# Patient Record
Sex: FEMALE | Race: WHITE | NOT HISPANIC OR LATINO | Employment: OTHER | URBAN - METROPOLITAN AREA
[De-identification: names, ages, dates, MRNs, and addresses within clinical notes are randomized per-mention and may not be internally consistent; named-entity substitution may affect disease eponyms.]

---

## 2022-09-04 ENCOUNTER — HOSPITAL ENCOUNTER (OUTPATIENT)
Facility: HOSPITAL | Age: 87
Setting detail: OBSERVATION
Discharge: HOME | End: 2022-09-06
Attending: EMERGENCY MEDICINE | Admitting: RADIOLOGY
Payer: MEDICARE

## 2022-09-04 DIAGNOSIS — K56.609 SBO (SMALL BOWEL OBSTRUCTION) (H): ICD-10-CM

## 2022-09-04 PROCEDURE — 83690 ASSAY OF LIPASE: CPT | Performed by: EMERGENCY MEDICINE

## 2022-09-04 PROCEDURE — 250N000011 HC RX IP 250 OP 636: Performed by: EMERGENCY MEDICINE

## 2022-09-04 PROCEDURE — C9803 HOPD COVID-19 SPEC COLLECT: HCPCS

## 2022-09-04 PROCEDURE — 99285 EMERGENCY DEPT VISIT HI MDM: CPT | Mod: 25

## 2022-09-04 PROCEDURE — 96361 HYDRATE IV INFUSION ADD-ON: CPT

## 2022-09-04 PROCEDURE — 96376 TX/PRO/DX INJ SAME DRUG ADON: CPT

## 2022-09-04 PROCEDURE — 96374 THER/PROPH/DIAG INJ IV PUSH: CPT

## 2022-09-04 PROCEDURE — 258N000003 HC RX IP 258 OP 636: Performed by: EMERGENCY MEDICINE

## 2022-09-04 PROCEDURE — 96375 TX/PRO/DX INJ NEW DRUG ADDON: CPT

## 2022-09-04 PROCEDURE — 93005 ELECTROCARDIOGRAM TRACING: CPT | Performed by: EMERGENCY MEDICINE

## 2022-09-04 PROCEDURE — 85025 COMPLETE CBC W/AUTO DIFF WBC: CPT | Performed by: EMERGENCY MEDICINE

## 2022-09-04 PROCEDURE — 36415 COLL VENOUS BLD VENIPUNCTURE: CPT | Performed by: EMERGENCY MEDICINE

## 2022-09-04 PROCEDURE — 84484 ASSAY OF TROPONIN QUANT: CPT | Performed by: EMERGENCY MEDICINE

## 2022-09-04 RX ORDER — MORPHINE SULFATE 4 MG/ML
4 INJECTION, SOLUTION INTRAMUSCULAR; INTRAVENOUS
Status: COMPLETED | OUTPATIENT
Start: 2022-09-04 | End: 2022-09-05

## 2022-09-04 RX ORDER — ONDANSETRON 2 MG/ML
4 INJECTION INTRAMUSCULAR; INTRAVENOUS EVERY 30 MIN PRN
Status: DISCONTINUED | OUTPATIENT
Start: 2022-09-04 | End: 2022-09-06 | Stop reason: HOSPADM

## 2022-09-04 RX ADMIN — ONDANSETRON 4 MG: 2 INJECTION INTRAMUSCULAR; INTRAVENOUS at 23:47

## 2022-09-04 RX ADMIN — SODIUM CHLORIDE 500 ML: 9 INJECTION, SOLUTION INTRAVENOUS at 23:47

## 2022-09-04 ASSESSMENT — ENCOUNTER SYMPTOMS
VOMITING: 1
ABDOMINAL PAIN: 1
DIARRHEA: 1
NAUSEA: 1
ABDOMINAL DISTENTION: 0

## 2022-09-04 ASSESSMENT — ACTIVITIES OF DAILY LIVING (ADL): ADLS_ACUITY_SCORE: 35

## 2022-09-05 ENCOUNTER — APPOINTMENT (OUTPATIENT)
Dept: RADIOLOGY | Facility: HOSPITAL | Age: 87
End: 2022-09-05
Attending: INTERNAL MEDICINE
Payer: MEDICARE

## 2022-09-05 ENCOUNTER — APPOINTMENT (OUTPATIENT)
Dept: CT IMAGING | Facility: HOSPITAL | Age: 87
End: 2022-09-05
Attending: EMERGENCY MEDICINE
Payer: MEDICARE

## 2022-09-05 ENCOUNTER — APPOINTMENT (OUTPATIENT)
Dept: RADIOLOGY | Facility: HOSPITAL | Age: 87
End: 2022-09-05
Attending: FAMILY MEDICINE
Payer: MEDICARE

## 2022-09-05 PROBLEM — E78.5 DYSLIPIDEMIA: Status: ACTIVE | Noted: 2022-09-05

## 2022-09-05 PROBLEM — K56.609 SBO (SMALL BOWEL OBSTRUCTION) (H): Status: ACTIVE | Noted: 2022-09-05

## 2022-09-05 PROBLEM — K21.9 GERD (GASTROESOPHAGEAL REFLUX DISEASE): Status: ACTIVE | Noted: 2022-09-05

## 2022-09-05 PROBLEM — N18.30 CKD (CHRONIC KIDNEY DISEASE) STAGE 3, GFR 30-59 ML/MIN (H): Status: ACTIVE | Noted: 2022-09-05

## 2022-09-05 PROBLEM — E03.9 HYPOTHYROIDISM: Status: ACTIVE | Noted: 2022-09-05

## 2022-09-05 PROBLEM — J30.2 SEASONAL ALLERGIES: Status: ACTIVE | Noted: 2022-09-05

## 2022-09-05 LAB
ACANTHOCYTES BLD QL SMEAR: SLIGHT
ALBUMIN SERPL-MCNC: 3.7 G/DL (ref 3.5–5)
ALBUMIN UR-MCNC: 10 MG/DL
ALP SERPL-CCNC: 72 U/L (ref 45–120)
ALT SERPL W P-5'-P-CCNC: 11 U/L (ref 0–45)
ANION GAP SERPL CALCULATED.3IONS-SCNC: 10 MMOL/L (ref 5–18)
APPEARANCE UR: CLEAR
AST SERPL W P-5'-P-CCNC: 17 U/L (ref 0–40)
BASOPHILS # BLD AUTO: 0 10E3/UL (ref 0–0.2)
BASOPHILS NFR BLD AUTO: 0 %
BILIRUB SERPL-MCNC: 0.5 MG/DL (ref 0–1)
BILIRUB UR QL STRIP: NEGATIVE
BUN SERPL-MCNC: 21 MG/DL (ref 8–28)
BURR CELLS BLD QL SMEAR: SLIGHT
CALCIUM SERPL-MCNC: 9.1 MG/DL (ref 8.5–10.5)
CHLORIDE BLD-SCNC: 106 MMOL/L (ref 98–107)
CO2 SERPL-SCNC: 23 MMOL/L (ref 22–31)
COLOR UR AUTO: ABNORMAL
CREAT SERPL-MCNC: 1.12 MG/DL (ref 0.6–1.1)
EOSINOPHIL # BLD AUTO: 0 10E3/UL (ref 0–0.7)
EOSINOPHIL NFR BLD AUTO: 0 %
ERYTHROCYTE [DISTWIDTH] IN BLOOD BY AUTOMATED COUNT: 14.6 % (ref 10–15)
GFR SERPL CREATININE-BSD FRML MDRD: 44 ML/MIN/1.73M2
GLUCOSE BLD-MCNC: 129 MG/DL (ref 70–125)
GLUCOSE UR STRIP-MCNC: NEGATIVE MG/DL
HCT VFR BLD AUTO: 43.2 % (ref 35–47)
HGB BLD-MCNC: 14.3 G/DL (ref 11.7–15.7)
HGB UR QL STRIP: NEGATIVE
HOLD SPECIMEN: NORMAL
IMM GRANULOCYTES # BLD: 0 10E3/UL
IMM GRANULOCYTES NFR BLD: 0 %
KETONES UR STRIP-MCNC: NEGATIVE MG/DL
LACTATE SERPL-SCNC: 1 MMOL/L (ref 0.7–2)
LEUKOCYTE ESTERASE UR QL STRIP: NEGATIVE
LIPASE SERPL-CCNC: 26 U/L (ref 0–52)
LYMPHOCYTES # BLD AUTO: 1 10E3/UL (ref 0.8–5.3)
LYMPHOCYTES NFR BLD AUTO: 11 %
MAGNESIUM SERPL-MCNC: 2 MG/DL (ref 1.8–2.6)
MCH RBC QN AUTO: 30 PG (ref 26.5–33)
MCHC RBC AUTO-ENTMCNC: 33.1 G/DL (ref 31.5–36.5)
MCV RBC AUTO: 91 FL (ref 78–100)
MONOCYTES # BLD AUTO: 0.6 10E3/UL (ref 0–1.3)
MONOCYTES NFR BLD AUTO: 6 %
MUCOUS THREADS #/AREA URNS LPF: PRESENT /LPF
NEUTROPHILS # BLD AUTO: 7.9 10E3/UL (ref 1.6–8.3)
NEUTROPHILS NFR BLD AUTO: 83 %
NITRATE UR QL: NEGATIVE
NRBC # BLD AUTO: 0 10E3/UL
NRBC BLD AUTO-RTO: 0 /100
PH UR STRIP: 6 [PH] (ref 5–7)
PLAT MORPH BLD: ABNORMAL
PLATELET # BLD AUTO: 178 10E3/UL (ref 150–450)
POTASSIUM BLD-SCNC: 4.1 MMOL/L (ref 3.5–5)
PROT SERPL-MCNC: 7.3 G/DL (ref 6–8)
RBC # BLD AUTO: 4.77 10E6/UL (ref 3.8–5.2)
RBC MORPH BLD: ABNORMAL
RBC URINE: <1 /HPF
SARS-COV-2 RNA RESP QL NAA+PROBE: NEGATIVE
SODIUM SERPL-SCNC: 139 MMOL/L (ref 136–145)
SP GR UR STRIP: >1.05 (ref 1–1.03)
SQUAMOUS EPITHELIAL: <1 /HPF
TROPONIN I SERPL-MCNC: 0.02 NG/ML (ref 0–0.29)
UROBILINOGEN UR STRIP-MCNC: <2 MG/DL
WBC # BLD AUTO: 9.5 10E3/UL (ref 4–11)
WBC URINE: <1 /HPF

## 2022-09-05 PROCEDURE — 99221 1ST HOSP IP/OBS SF/LOW 40: CPT | Performed by: SURGERY

## 2022-09-05 PROCEDURE — U0003 INFECTIOUS AGENT DETECTION BY NUCLEIC ACID (DNA OR RNA); SEVERE ACUTE RESPIRATORY SYNDROME CORONAVIRUS 2 (SARS-COV-2) (CORONAVIRUS DISEASE [COVID-19]), AMPLIFIED PROBE TECHNIQUE, MAKING USE OF HIGH THROUGHPUT TECHNOLOGIES AS DESCRIBED BY CMS-2020-01-R: HCPCS | Performed by: EMERGENCY MEDICINE

## 2022-09-05 PROCEDURE — 120N000001 HC R&B MED SURG/OB

## 2022-09-05 PROCEDURE — 999N000065 XR ABDOMEN PORT 1 VIEW

## 2022-09-05 PROCEDURE — 36415 COLL VENOUS BLD VENIPUNCTURE: CPT | Performed by: EMERGENCY MEDICINE

## 2022-09-05 PROCEDURE — 250N000009 HC RX 250: Performed by: EMERGENCY MEDICINE

## 2022-09-05 PROCEDURE — 74018 RADEX ABDOMEN 1 VIEW: CPT | Mod: 76

## 2022-09-05 PROCEDURE — 83735 ASSAY OF MAGNESIUM: CPT | Performed by: EMERGENCY MEDICINE

## 2022-09-05 PROCEDURE — 81001 URINALYSIS AUTO W/SCOPE: CPT | Performed by: EMERGENCY MEDICINE

## 2022-09-05 PROCEDURE — 74340 X-RAY GUIDE FOR GI TUBE: CPT

## 2022-09-05 PROCEDURE — 74018 RADEX ABDOMEN 1 VIEW: CPT

## 2022-09-05 PROCEDURE — 96372 THER/PROPH/DIAG INJ SC/IM: CPT | Performed by: INTERNAL MEDICINE

## 2022-09-05 PROCEDURE — 80053 COMPREHEN METABOLIC PANEL: CPT | Performed by: EMERGENCY MEDICINE

## 2022-09-05 PROCEDURE — 250N000013 HC RX MED GY IP 250 OP 250 PS 637: Performed by: FAMILY MEDICINE

## 2022-09-05 PROCEDURE — C9113 INJ PANTOPRAZOLE SODIUM, VIA: HCPCS | Performed by: INTERNAL MEDICINE

## 2022-09-05 PROCEDURE — 250N000011 HC RX IP 250 OP 636: Performed by: INTERNAL MEDICINE

## 2022-09-05 PROCEDURE — 99223 1ST HOSP IP/OBS HIGH 75: CPT | Mod: AI | Performed by: INTERNAL MEDICINE

## 2022-09-05 PROCEDURE — 258N000003 HC RX IP 258 OP 636: Performed by: INTERNAL MEDICINE

## 2022-09-05 PROCEDURE — 250N000011 HC RX IP 250 OP 636: Performed by: EMERGENCY MEDICINE

## 2022-09-05 PROCEDURE — 74177 CT ABD & PELVIS W/CONTRAST: CPT | Mod: MG

## 2022-09-05 PROCEDURE — 83605 ASSAY OF LACTIC ACID: CPT | Performed by: EMERGENCY MEDICINE

## 2022-09-05 RX ORDER — ASCORBIC ACID 500 MG
500 TABLET ORAL DAILY
COMMUNITY

## 2022-09-05 RX ORDER — CHLORAL HYDRATE 500 MG
1 CAPSULE ORAL DAILY
COMMUNITY

## 2022-09-05 RX ORDER — LEVOTHYROXINE SODIUM 75 UG/1
75 TABLET ORAL
COMMUNITY

## 2022-09-05 RX ORDER — IOPAMIDOL 755 MG/ML
75 INJECTION, SOLUTION INTRAVASCULAR ONCE
Status: COMPLETED | OUTPATIENT
Start: 2022-09-05 | End: 2022-09-05

## 2022-09-05 RX ORDER — ENOXAPARIN SODIUM 100 MG/ML
30 INJECTION SUBCUTANEOUS EVERY 24 HOURS
Status: DISCONTINUED | OUTPATIENT
Start: 2022-09-05 | End: 2022-09-06 | Stop reason: HOSPADM

## 2022-09-05 RX ORDER — LIDOCAINE 40 MG/G
CREAM TOPICAL
Status: DISCONTINUED | OUTPATIENT
Start: 2022-09-05 | End: 2022-09-06 | Stop reason: HOSPADM

## 2022-09-05 RX ORDER — PANTOPRAZOLE SODIUM 40 MG/1
40 TABLET, DELAYED RELEASE ORAL
COMMUNITY

## 2022-09-05 RX ORDER — MULTIVITAMIN,THERAPEUTIC
1 TABLET ORAL DAILY
COMMUNITY

## 2022-09-05 RX ORDER — LIDOCAINE HYDROCHLORIDE 20 MG/ML
10 JELLY TOPICAL ONCE
Status: COMPLETED | OUTPATIENT
Start: 2022-09-05 | End: 2022-09-05

## 2022-09-05 RX ORDER — HYDROXYZINE PAMOATE 25 MG/1
25 CAPSULE ORAL
COMMUNITY

## 2022-09-05 RX ORDER — DIET SUPP 21/CALCIUM PHOSPHATE 190MG-85MG
1 TABLET ORAL DAILY
COMMUNITY

## 2022-09-05 RX ORDER — SODIUM CHLORIDE, SODIUM LACTATE, POTASSIUM CHLORIDE, CALCIUM CHLORIDE 600; 310; 30; 20 MG/100ML; MG/100ML; MG/100ML; MG/100ML
INJECTION, SOLUTION INTRAVENOUS CONTINUOUS
Status: DISCONTINUED | OUTPATIENT
Start: 2022-09-05 | End: 2022-09-06

## 2022-09-05 RX ADMIN — SODIUM CHLORIDE, POTASSIUM CHLORIDE, SODIUM LACTATE AND CALCIUM CHLORIDE: 600; 310; 30; 20 INJECTION, SOLUTION INTRAVENOUS at 06:49

## 2022-09-05 RX ADMIN — PHENOL 1 ML: 1.5 LIQUID ORAL at 14:56

## 2022-09-05 RX ADMIN — DIATRIZOATE MEGLUMINE AND DIATRIZOATE SODIUM 120 ML: 660; 100 SOLUTION ORAL; RECTAL at 09:53

## 2022-09-05 RX ADMIN — PANTOPRAZOLE SODIUM 40 MG: 40 INJECTION, POWDER, FOR SOLUTION INTRAVENOUS at 08:39

## 2022-09-05 RX ADMIN — PHENOL 1 ML: 1.5 LIQUID ORAL at 11:15

## 2022-09-05 RX ADMIN — ONDANSETRON 4 MG: 2 INJECTION INTRAMUSCULAR; INTRAVENOUS at 06:11

## 2022-09-05 RX ADMIN — MORPHINE SULFATE 4 MG: 4 INJECTION, SOLUTION INTRAMUSCULAR; INTRAVENOUS at 06:07

## 2022-09-05 RX ADMIN — IOPAMIDOL 75 ML: 755 INJECTION, SOLUTION INTRAVENOUS at 02:41

## 2022-09-05 RX ADMIN — ENOXAPARIN SODIUM 30 MG: 30 INJECTION SUBCUTANEOUS at 08:40

## 2022-09-05 RX ADMIN — LIDOCAINE HYDROCHLORIDE 1 ML: 20 JELLY TOPICAL at 05:53

## 2022-09-05 ASSESSMENT — ACTIVITIES OF DAILY LIVING (ADL)
DRESSING/BATHING_DIFFICULTY: NO
CHANGE_IN_FUNCTIONAL_STATUS_SINCE_ONSET_OF_CURRENT_ILLNESS/INJURY: NO
CONCENTRATING,_REMEMBERING_OR_MAKING_DECISIONS_DIFFICULTY: NO
ADLS_ACUITY_SCORE: 35
ADLS_ACUITY_SCORE: 35
TOILETING_ISSUES: NO
ADLS_ACUITY_SCORE: 35
ADLS_ACUITY_SCORE: 24
DOING_ERRANDS_INDEPENDENTLY_DIFFICULTY: YES
DIFFICULTY_EATING/SWALLOWING: NO
ADLS_ACUITY_SCORE: 35
FALL_HISTORY_WITHIN_LAST_SIX_MONTHS: NO
ADLS_ACUITY_SCORE: 35
ADLS_ACUITY_SCORE: 35
ADLS_ACUITY_SCORE: 24
WALKING_OR_CLIMBING_STAIRS_DIFFICULTY: NO
WEAR_GLASSES_OR_BLIND: YES
ADLS_ACUITY_SCORE: 35
ADLS_ACUITY_SCORE: 24
ADLS_ACUITY_SCORE: 35
DEPENDENT_IADLS:: TRANSPORTATION;CLEANING;COOKING;LAUNDRY;SHOPPING;MEAL PREPARATION
ADLS_ACUITY_SCORE: 35

## 2022-09-05 NOTE — ED NOTES
DR. Gutiérrez, hospitalist, notified of 3 x loose stools and 100 ml output from NG in past 8 houors.

## 2022-09-05 NOTE — ED PROVIDER NOTES
NAME: Smita Pastor  AGE: 89 year old female  YOB: 1924  MRN: [id number]  EVALUATION DATE & TIME: 9/4/2022 10:43 PM    PCP: No primary care provider on file.    ED PROVIDER: Torsten Infante M.D.      Chief Complaint   Patient presents with     Abdominal Pain     FINAL IMPRESSION:  1. SBO (small bowel obstruction) (H)      MEDICAL DECISION MAKING:    10:58 PM I met with the patient, obtained history, performed an initial exam, and discussed options and plan for diagnostics and treatment here in the ED.   3:17 AM I rechecked and updated patient on results.   3:22 AM I discussed case with Dr. Ndiaye, hospitalist, who accepts the patient for admission.    Patient was clinically assessed and consented to treatment. After assessment, medical decision making and workup were discussed with the patient. The patient was agreeable to plan for testing, workup, and treatment.  Pertinent Labs & Imaging studies reviewed. (See chart for details)     Smita Pastor is a 89 year old female who presents with abdominal pain.   Differential diagnosis includes but not limited to gastroenteritis, gastritis, colitis, small bowel obstruction, ileus.  Patient with past history of multiple surgeries but does not report any bloated feeling did have bowel movement earlier today.  She reports nausea and vomiting since earlier this evening and possibly some right flank vomiting which could be blood versus something she ate.  She does not report any black or bloody stools.  Main concern would be for gastritis or gastroenteritis but also could possibly be a small bowel obstruction or may be pancreatitis.  Labs obtained and antiemetics and pain medication given.  Patient comfortable with the pain medication and labs showed no leukocytosis, metabolic panel with no acute findings and troponin was negative.  Patient sent for CT scan that showed small bowel obstruction with right lower abdominal hernia.  On repeat palpation again I can  "feel small possible defect in the right lower abdomen just lateral to a very dilated loop.  I discussed admission with the patient with pain control, possible NG tube which at this time patient has not nauseated and feels comfortable.  Additionally will check lactate given the findings of hernia.  Lactic acid was normal and patient discussed with hospitalist for admission.    0 minutes of critical care time    MEDICATIONS GIVEN IN THE EMERGENCY:  Medications   ondansetron (ZOFRAN) injection 4 mg (4 mg Intravenous Not Given 9/5/22 0319)   morphine (PF) injection 4 mg (has no administration in time range)   0.9% sodium chloride BOLUS (0 mLs Intravenous Stopped 9/5/22 0230)   iopamidol (ISOVUE-370) solution 75 mL (75 mLs Intravenous Given 9/5/22 0241)       NEW PRESCRIPTIONS STARTED AT TODAY'S ER VISIT:  New Prescriptions    No medications on file          =================================================================    HPI    Patient information was obtained from: Patient    Use of : N/A        Smita Pastor is a 89 year old female with a past medical history of hypothyroidism, who presents to the ED via EMS for the evaluation of abdominal pain.    Patient reports she woke up this morning with some RUQ abdominal pain. Shortly afterwards, she became nauseous and vomited. Patient notes she did see something red in he vomit but mentions she ate some red Jello, beets, and tomatoes today. She also notes some loose stools today. Otherwise, she denies any abdominal distension. Patient reports a history of hiatal hernia and notes previous hysterectomy and gall bladder removal. No other complaints at this time.    REVIEW OF SYSTEMS   Review of Systems   Gastrointestinal: Positive for abdominal pain (RUQ), diarrhea (\"loose stools\"), nausea and vomiting. Negative for abdominal distention.   All other systems reviewed and are negative.     PAST MEDICAL HISTORY:  Past Medical History:   Diagnosis Date     " "Dyslipidemia      Seasonal allergies        PAST SURGICAL HISTORY:  Past Surgical History:   Procedure Laterality Date     CHOLECYSTECTOMY       HYSTERECTOMY         CURRENT MEDICATIONS:      Current Facility-Administered Medications:      morphine (PF) injection 4 mg, 4 mg, Intravenous, Once PRN, Torsten Infante MD     ondansetron (ZOFRAN) injection 4 mg, 4 mg, Intravenous, Q30 Min PRN, Torsten Infante MD, 4 mg at 09/04/22 3164  No current outpatient medications on file.    ALLERGIES:  Allergies   Allergen Reactions     Amoxicillin Swelling     Statins-Hmg-Coa Reductase Inhibitors [Hmg-Coa-R Inhibitors] Unknown       FAMILY HISTORY:  Family History   Problem Relation Age of Onset     Tuberculosis Mother      Heart Disease Father        SOCIAL HISTORY:   Social History     Socioeconomic History     Marital status:        PHYSICAL EXAM:    Vitals: /63   Pulse 93   Temp 98.6  F (37  C) (Oral)   Resp 20   Ht 1.6 m (5' 3\")   Wt 53.1 kg (117 lb)   SpO2 96%   BMI 20.73 kg/m     Physical Exam  Vitals and nursing note reviewed.   Constitutional:       General: She is not in acute distress.     Appearance: She is well-developed and normal weight. She is not ill-appearing or toxic-appearing.   HENT:      Head: Normocephalic.   Eyes:      General: No scleral icterus.  Cardiovascular:      Rate and Rhythm: Normal rate and regular rhythm.      Heart sounds: Normal heart sounds.   Pulmonary:      Effort: Pulmonary effort is normal. No respiratory distress.      Breath sounds: Normal breath sounds.   Abdominal:      General: Abdomen is flat.      Palpations: Abdomen is soft.      Tenderness: There is abdominal tenderness in the right upper quadrant, right lower quadrant and periumbilical area. There is no right CVA tenderness, left CVA tenderness, guarding or rebound.      Hernia: A hernia is present.       Neurological:      Mental Status: She is alert.           LAB:  All pertinent " labs reviewed and interpreted.  Labs Ordered and Resulted from Time of ED Arrival to Time of ED Departure   COMPREHENSIVE METABOLIC PANEL - Abnormal       Result Value    Sodium 139      Potassium 4.1      Chloride 106      Carbon Dioxide (CO2) 23      Anion Gap 10      Urea Nitrogen 21      Creatinine 1.12 (*)     Calcium 9.1      Glucose 129 (*)     Alkaline Phosphatase 72      AST 17      ALT 11      Protein Total 7.3      Albumin 3.7      Bilirubin Total 0.5      GFR Estimate 44 (*)    RBC AND PLATELET MORPHOLOGY - Abnormal    Platelet Assessment        Value: Automated Count Confirmed. Platelet morphology is normal.    Acanthocytes Slight (*)     Pj Cells Slight (*)     RBC Morphology Confirmed RBC Indices     LIPASE - Normal    Lipase 26     TROPONIN I - Normal    Troponin I 0.02     MAGNESIUM - Normal    Magnesium 2.0     COVID-19 VIRUS (CORONAVIRUS) BY PCR - Normal    SARS CoV2 PCR Negative     LACTIC ACID WHOLE BLOOD - Normal    Lactic Acid 1.0     CBC WITH PLATELETS AND DIFFERENTIAL    WBC Count 9.5      RBC Count 4.77      Hemoglobin 14.3      Hematocrit 43.2      MCV 91      MCH 30.0      MCHC 33.1      RDW 14.6      Platelet Count 178      % Neutrophils 83      % Lymphocytes 11      % Monocytes 6      % Eosinophils 0      % Basophils 0      % Immature Granulocytes 0      NRBCs per 100 WBC 0      Absolute Neutrophils 7.9      Absolute Lymphocytes 1.0      Absolute Monocytes 0.6      Absolute Eosinophils 0.0      Absolute Basophils 0.0      Absolute Immature Granulocytes 0.0      Absolute NRBCs 0.0     ROUTINE UA WITH MICROSCOPIC REFLEX TO CULTURE     RADIOLOGY:  CT Abdomen Pelvis w Contrast   Final Result   IMPRESSION:    1.  Distal small bowel obstruction caused by a short segment of small bowel within a right low anterior abdominal wall hernia.   2.  Moderate size hiatal hernia.   3.  Small amount of ascites.        EKG:   Performed at: 11:21 PM on September 4, 2022.  Impression: Sinus rhythm with  first-degree AV block.  No signs of acute ST elevation ischemia, no irregular rhythm.  Rate: 96 bpm  Rhythm: Sinus rhythm with first-degree AV block  QRS Interval: 88 ms  QTc Interval: 447 ms  Comparison: No prior EKGs.  I have independently reviewed and interpreted the EKG(s) documented above.     PROCEDURES:   Procedures     I, Megan Donaldson, am serving as a scribe to document services personally performed by Dr. Torsten Infante  based on my observation and the provider's statements to me. I, Torsten Infante MD attest that Megan Donaldson is acting in a scribe capacity, has observed my performance of the services and has documented them in accordance with my direction.    Torsten Infante M.D.  Emergency Medicine  [facility]     Torsten Infante MD  09/05/22 4484

## 2022-09-05 NOTE — ED NOTES
Bed: JNED-09  Expected date: 9/4/22  Expected time: 10:32 PM  Means of arrival: Ambulance  Comments:  [facility] 89 F abd pain n/v

## 2022-09-05 NOTE — H&P
[facility]    History and Physical - Hospitalist Service       Date of Admission:  9/4/2022    Assessment & Plan      Smita Pastor is a 89 yo F with h/o GERD, hypothyroid, [place], and seasonal allergies who presents with an acute SBO at the level of a abdominal wall hernia.  Lactic acid is normal but patient is having ongoing discomfort so will place NG and give gastrograffin challenge after she has been decompressed for a couple of hours with suction.  Will have surgery evaluate her as well.    Principal Problem:    SBO (small bowel obstruction) -likely associated with abdominal wall hernia based on CT.  She had a similar episode a few months ago she states that resolved on its own after couple days but she did not seek medical attention for that.  This time the pain was worse so came to the ED.  Place NG to low intermittent suction, Gastrografin challenge, surgery evaluation.    Active Problems:    Hypothyroidism -unclear dosing, wants pharmacy verifies the dose then this could be given IV if she is still n.p.o. or orally if she is able to tolerate pills    Wears hearing aid    Seasonal allergies -usually uses Allegra and a steroid nasal spray at home.  Will hold these for now    GERD (gastroesophageal reflux disease) -she is on some type of acid blocker that she does not remember what.  She used to be on Protonix per [facility] records.  We will give IV Protonix for now and follow.         Diet: NPO for Medical/Clinical Reasons Except for: No Exceptions    DVT Prophylaxis: Enoxaparin (Lovenox) SQ  [place] Catheter: Not present  Central Lines: None  Cardiac Monitoring: None  Code Status:   No CPR    Clinically Significant Risk Factors Present on Admission                          Disposition Plan    2-3 days     The patient's care was discussed with the Patient and Patient's Family.    Андрей Ndiaye MD  Hospitalist Service  [facility]  Securely message with the Vocera Web  Console (learn more here)  Text page via [facility] Paging/Directory         ______________________________________________________________________    Chief Complaint   Abdominal pain    History is obtained from the patient and electronic health record    History of Present Illness   Smita Pastor is a 89 yo F with h/o GERD, hypothyroid, [place], and seasonal allergies who presents with abdominal pain.  Patient states that a few months ago she had a similar episode of abdominal pain that was more lower in her abdomen but last 1 to 2 days that resolved on its own.  She then did fine up until Sunday morning 9/4/2022 when she upper quadrant little more to the right side abdominal pain that was getting quite severe through the course of the morning.  She had some nausea and vomiting x2.  She tried eating a little bit and she thinks that made it worse.  The pain was crampy and sharp at times.  It did extend into her lower abdomen at times.  She denies any chest pain or shortness of breath or cough with this.  No dysuria hematuria or frequency.  No melena or hematochezia.  Her bowels were little loose but no diarrhea.  She has not had any cold or flu symptoms recently.    Review of Systems    The 10 point Review of Systems is negative other than noted in the HPI.  Knees bothering her last few months a little more than chronically for knee problems but not changed in last few days.    Past Medical History    I have reviewed this patient's medical history and updated it with pertinent information if needed.   Past Medical History:   Diagnosis Date     Dyslipidemia      GERD (gastroesophageal reflux disease)      Hiatal hernia      Hypothyroidism 09/05/2022     Seasonal allergies      Wears hearing aid 06/14/2016       Past Surgical History   I have reviewed this patient's surgical history and updated it with pertinent information if needed.  Past Surgical History:   Procedure Laterality Date     CHOLECYSTECTOMY       HYSTERECTOMY          Social History   I have reviewed this patient's social history and updated it with pertinent information if needed.  Social History     Tobacco Use     Smoking status: Never Smoker   Substance Use Topics     Alcohol use: Not Currently     Comment: occasional in the past     Drug use: Never       Family History   I have reviewed this patient's family history and updated it with pertinent information if needed.  Family History   Problem Relation Age of Onset     Tuberculosis Mother      Heart Disease Father        Prior to Admission Medications   None     Allergies   Allergies   Allergen Reactions     Amoxicillin Swelling     Statins-Hmg-Coa Reductase Inhibitors [Hmg-Coa-R Inhibitors] Unknown       Physical Exam   Vital Signs: Temp: 98.6  F (37  C) Temp src: Oral BP: 123/56 Pulse: 88   Resp: 20 SpO2: 93 %      Weight: 117 lbs 0 oz    General Appearance: Elderly female in mild distress due to discomfort  Eyes: PERRL, EOMI, conjunctive are clear and moist  HEENT: NC/AT, ears and nose clear without discharge, oropharynx is clear without exudates or erythema, neck is supple and nontender  Respiratory: Clear to auscultation with occasional rhonchi  Cardiovascular: Regular rate and rhythm S1 and S2, 1/6 systolic murmur, no JVD no edema  GI: Normal bowel sounds, soft, tender upper quadrants more to the right, no rebound or guarding, no hepatosplenomegaly appreciated  Lymph/Hematologic: No lymphadenopathy noted in neck or groin area  Skin: No rashes or lesions noted on exposed areas  Musculoskeletal: Extremities are warm and nontender, joints have no erythema.  There is some chronic appearing swelling of her knees bilaterally that she says is unchanged  Neurologic: Alert, oriented, cranial nerves II through XII intact, motor strength 5 out of 5 upper lower extremities symmetric, sensory gross intact light touch  Psychiatric: Affect is normal and calm    Data   Data reviewed today: I reviewed all medications, new labs  and imaging results over the last 24 hours.     Recent Labs   Lab 09/05/22  0125 09/04/22  2341   WBC  --  9.5   HGB  --  14.3   MCV  --  91   PLT  --  178     --    POTASSIUM 4.1  --    CHLORIDE 106  --    CO2 23  --    BUN 21  --    CR 1.12*  --    ANIONGAP 10  --    KATHERYN 9.1  --    *  --    ALBUMIN 3.7  --    PROTTOTAL 7.3  --    BILITOTAL 0.5  --    ALKPHOS 72  --    ALT 11  --    AST 17  --    LIPASE  --  26     Recent Results (from the past 24 hour(s))   CT Abdomen Pelvis w Contrast    Narrative    EXAM: CT ABDOMEN PELVIS W CONTRAST  LOCATION: [facility]  DATE/TIME: 9/5/2022 2:43 AM    INDICATION: Upper to RUQ abdominal pain. N/V.  COMPARISON: None.  TECHNIQUE: CT scan of the abdomen and pelvis was performed following injection of IV contrast. Multiplanar reformats were obtained. Dose reduction techniques were used.  CONTRAST: Isovue 370 75 mL.    FINDINGS:   LOWER CHEST: Mild fibrosis at the lung bases. Moderate size hiatal hernia.    HEPATOBILIARY: The gallbladder is absent.    PANCREAS: Normal.    SPLEEN: Normal.    ADRENAL GLANDS: Normal.    KIDNEYS/BLADDER: Normal.    BOWEL: There are multiple dilated small bowel loops. There is a right low anterior abdominal wall hernia containing a short segment of small bowel and causing the obstruction. Distal small bowel and colon are decompressed. There are colonic diverticula   without acute diverticulitis. No free intraperitoneal gas. Small amount of ascites.    LYMPH NODES: Normal.    VASCULATURE: Atherosclerotic calcification of the aorta and its branches. No aneurysm.    PELVIC ORGANS: The uterus is absent. There is no adnexal mass.    MUSCULOSKELETAL: Degenerative disease in the spine and hips.      Impression    IMPRESSION:   1.  Distal small bowel obstruction caused by a short segment of small bowel within a right low anterior abdominal wall hernia.  2.  Moderate size hiatal hernia.  3.  Small amount of ascites.   XR  Abdomen Port 1 View    Narrative    EXAM: XR ABDOMEN PORT 1 VIEW  LOCATION: [facility]  DATE/TIME: 9/5/2022 6:21 AM    INDICATION: check NG tube placement  COMPARISON: CT from earlier today      Impression    IMPRESSION: Enteric tube is coiled in the hiatal hernia. Surgical clips are noted in the right upper quadrant. Partially imaged bowel obstruction.

## 2022-09-05 NOTE — ED NOTES
"[facility] ED Handoff Report    ED Chief Complaint: abd pain    ED Diagnosis:  (K56.609) SBO (small bowel obstruction) (H)  Comment:   Plan: ng to lis       PMH:    Past Medical History:   Diagnosis Date     Dyslipidemia      GERD (gastroesophageal reflux disease)      Hiatal hernia      Hypothyroidism 09/05/2022     Seasonal allergies      Wears hearing aid 06/14/2016        Code Status:  No CPR- Do NOT Intubate     Falls Risk: Yes Band: Applied    Current Living Situation/Residence: lives with their son or daughter     Elimination Status: Continent: Yes     Activity Level: SBA    Patients Preferred Language:  English     Needed: No    Vital Signs:  /58   Pulse 81   Temp 98.6  F (37  C) (Oral)   Resp 30   Ht 1.6 m (5' 3\")   Wt 53.1 kg (117 lb)   SpO2 94%   BMI 20.73 kg/m       Cardiac Rhythm: NSR    Pain Score: 0/10    Is the Patient Confused:  No    Last Food or Drink: 9/4/22     Focused Assessment:  abd pain, ng to lis, prn analgesic/antiemetic    Tests Performed: Done: Labs and Imaging    Treatments Provided:  gastrograffin given at 1000am today, abd xray at 1800 today    Family Dynamics/Concerns: No    Family Updated On Visitor Policy: Yes    Plan of Care Communicated to Family: Yes , daughter present informed of plan    Belongings Checklist Done and Signed by Patient: No    Medications sent with patient: none    Covid: asymptomatic , negative    Additional Information:     RN: Milton Carter RN   9/5/2022 10:52 AM       "

## 2022-09-05 NOTE — ED NOTES
Pt able to stand and sit on the wheelchair. She was taken to the restroom but missed the hat when she urinated so no UA will be acquired.

## 2022-09-05 NOTE — CONSULTS
"Care Management Initial Consult    General Information  Assessment completed with: Patient, Children, Smita and daughter Nicolle  Type of CM/SW Visit: Initial Assessment    Primary Care Provider verified and updated as needed: Yes   Readmission within the last 30 days: no previous admission in last 30 days      Reason for Consult: discharge planning  Advance Care Planning: Advance Care Planning Reviewed: no concerns identified          Communication Assessment  Patient's communication style: spoken language (English or Bilingual)                         Living Environment:   People in home: child(ynes), adult  Smita and daughter Nicolle  Current living Arrangements: house (\"split entry house, but there is a stair lift\")      Able to return to prior arrangements: yes       Family/Social Support:  Care provided by: self, child(ynes)  Provides care for: no one, unable/limited ability to care for self  Marital Status:   Children          Description of Support System: Supportive, Involved    Support Assessment: Adequate family and caregiver support, Adequate social supports, Patient communicates needs well met    Current Resources:   Patient receiving home care services: No     Community Resources: None  Equipment currently used at home: cane, straight, walker, rolling, walker, standard, other (see comments) (\"stair lift; uses the cane mostly inside the house; has 2 walkers and uses them mostly out and about\")  Supplies currently used at home: Hearing Aid Batteries, Other (\"hearing aids, glasses\")    Employment/Financial:  Employment Status: retired     Employment/ Comments: \"no  benefits\"  Financial Concerns:     Referral to Financial Worker: No       Lifestyle & Psychosocial Needs:  Social Determinants of Health     Tobacco Use: Unknown     Smoking Tobacco Use: Never Smoker     Smokeless Tobacco Use: Unknown   Alcohol Use: Not on file   Financial Resource Strain: Not on file   Food Insecurity: Not on file " "  Transportation Needs: Not on file   Physical Activity: Not on file   Stress: Not on file   Social Connections: Not on file   Intimate Partner Violence: Not on file   Depression: Not on file   Housing Stability: Not on file       Functional Status:  Prior to admission patient needed assistance:   Dependent ADLs:: Ambulation-walker, Ambulation-cane, Independent  Dependent IADLs:: Transportation, Cleaning, Cooking, Laundry, Shopping, Meal Preparation (\"daughter helps\")  Assesssment of Functional Status: At functional baseline    Mental Health Status:          Chemical Dependency Status:                Values/Beliefs:  Spiritual, Cultural Beliefs, Hindu Practices, Values that affect care:                 Additional Information:  Smita lives in a house with her daughter Nicolle. It is a \"split entry house, but there is a stair lift\".    She is independent with ADLs at baseline and daughter can help PRN.  She uses the cane mostly inside the house and also has 2 walkers and uses them mostly out and about\".      Likely no discharge needs at this time.    Daughter to transport at discharge.    Catarina Fong RN      "

## 2022-09-05 NOTE — PROGRESS NOTES
Assessment & Plan   89-year-old female with HTN, hypothyroidism, mood disorder presents with small bowel obstruction.    Principal Problem:    SBO (small bowel obstruction) (H)  Active Problems:    Hypothyroidism    Wears hearing aid    Seasonal allergies    GERD (gastroesophageal reflux disease)    CKD (chronic kidney disease) stage 3, GFR 30-59 ml/min (H)    Present on Admission:    SBO (small bowel obstruction) (H)    Hypothyroidism    Wears hearing aid    Seasonal allergies    GERD (gastroesophageal reflux disease)      Small bowel obstruction  -Patient presented with abdominal pain in the setting of known abdominal wall hernia.  CT abdomen and pelvis with distal small bowel obstruction caused by short segment of small bowel within the right lower anterior abdominal wall hernia, moderate size hiatal hernia, small amount of ascites.  Patient otherwise hemodynamically stable, afebrile, normal lactic acid, no leukocytosis and no signs of infection or bowel ischemia/perforation.  NG tube placed in the emergency department.  -NG tube appears to be stuck in hiatal hernia, will have tubing placed under fluoroscopy to ensure in the stomach  -N.p.o.  -IV  -Antiemetics and pain control  -Small bowel follow-through  -Neurosurgery consulted, appreciate recommendations    CKD 3  -Baseline creatinine appears to be 1.1    Hypothyroidism  -TSH 3.23  -Hold home levothyroxine while n.p.o.    GERD  -Hold pantoprazole and p.o.    Mood disorder  -Hold home hydroxyzine as needed     Clinically Significant Risk Factors Present on Admission                          Electrolytes: currently within normal limits  Fluids: LR @ 100 ml/hr  Diet: NPO  VTE prophylaxis: lovenox    COVID19 symptom check 9/5/2022  Fevers/Chills/myalgias: NEGATIVE TO ALL  Sick contacts/COVID19 exposure: NEGATIVE TO ALL  Cough/trouble breathing/SOB/sore throat: NEGATIVE TO ALL  Diarrhea: NEGATIVE       Expected Discharge Date: 09/06/2022    Discharge Delays:  Voiding/Eating Trial needed             Subjective  Cc: abdominal pain    Please see full H&P for physical exam as documented by Dr Ndiaye on 9/5    Temp:  [98.6  F (37  C)] 98.6  F (37  C)  Pulse:  [80-99] 80  Resp:  [16-30] 18  BP: (101-149)/(51-81) 111/58  SpO2:  [79 %-98 %] 94 %    No intake or output data in the 24 hours ending 09/05/22 0808    Results    Recent Results (from the past 24 hour(s))   Lipase    Collection Time: 09/04/22 11:41 PM   Result Value Ref Range    Lipase 26 0 - 52 U/L   Troponin I    Collection Time: 09/04/22 11:41 PM   Result Value Ref Range    Troponin I 0.02 0.00 - 0.29 ng/mL   CBC with platelets and differential    Collection Time: 09/04/22 11:41 PM   Result Value Ref Range    WBC Count 9.5 4.0 - 11.0 10e3/uL    RBC Count 4.77 3.80 - 5.20 10e6/uL    Hemoglobin 14.3 11.7 - 15.7 g/dL    Hematocrit 43.2 35.0 - 47.0 %    MCV 91 78 - 100 fL    MCH 30.0 26.5 - 33.0 pg    MCHC 33.1 31.5 - 36.5 g/dL    RDW 14.6 10.0 - 15.0 %    Platelet Count 178 150 - 450 10e3/uL    % Neutrophils 83 %    % Lymphocytes 11 %    % Monocytes 6 %    % Eosinophils 0 %    % Basophils 0 %    % Immature Granulocytes 0 %    NRBCs per 100 WBC 0 <1 /100    Absolute Neutrophils 7.9 1.6 - 8.3 10e3/uL    Absolute Lymphocytes 1.0 0.8 - 5.3 10e3/uL    Absolute Monocytes 0.6 0.0 - 1.3 10e3/uL    Absolute Eosinophils 0.0 0.0 - 0.7 10e3/uL    Absolute Basophils 0.0 0.0 - 0.2 10e3/uL    Absolute Immature Granulocytes 0.0 <=0.4 10e3/uL    Absolute NRBCs 0.0 10e3/uL   RBC and Platelet Morphology    Collection Time: 09/04/22 11:41 PM   Result Value Ref Range    Platelet Assessment  Automated Count Confirmed. Platelet morphology is normal.     Automated Count Confirmed. Platelet morphology is normal.    Acanthocytes Slight (A) None Seen    Pj Cells Slight (A) None Seen    RBC Morphology Confirmed RBC Indices    Extra Red Top Tube    Collection Time: 09/05/22  1:21 AM   Result Value Ref Range    Hold Specimen JIC    Comprehensive  metabolic panel    Collection Time: 09/05/22  1:25 AM   Result Value Ref Range    Sodium 139 136 - 145 mmol/L    Potassium 4.1 3.5 - 5.0 mmol/L    Chloride 106 98 - 107 mmol/L    Carbon Dioxide (CO2) 23 22 - 31 mmol/L    Anion Gap 10 5 - 18 mmol/L    Urea Nitrogen 21 8 - 28 mg/dL    Creatinine 1.12 (H) 0.60 - 1.10 mg/dL    Calcium 9.1 8.5 - 10.5 mg/dL    Glucose 129 (H) 70 - 125 mg/dL    Alkaline Phosphatase 72 45 - 120 U/L    AST 17 0 - 40 U/L    ALT 11 0 - 45 U/L    Protein Total 7.3 6.0 - 8.0 g/dL    Albumin 3.7 3.5 - 5.0 g/dL    Bilirubin Total 0.5 0.0 - 1.0 mg/dL    GFR Estimate 44 (L) >60 mL/min/1.73m2   Magnesium    Collection Time: 09/05/22  1:25 AM   Result Value Ref Range    Magnesium 2.0 1.8 - 2.6 mg/dL   Lactic acid whole blood    Collection Time: 09/05/22  3:29 AM   Result Value Ref Range    Lactic Acid 1.0 0.7 - 2.0 mmol/L   Asymptomatic COVID-19 Virus (Coronavirus) by PCR Nasopharyngeal    Collection Time: 09/05/22  3:31 AM    Specimen: Nasopharyngeal; Swab   Result Value Ref Range    SARS CoV2 PCR Negative Negative   UA with Microscopic reflex to Culture    Collection Time: 09/05/22  6:23 AM    Specimen: Urine, Midstream   Result Value Ref Range    Color Urine Light Yellow Colorless, Straw, Light Yellow, Yellow    Appearance Urine Clear Clear    Glucose Urine Negative Negative mg/dL    Bilirubin Urine Negative Negative    Ketones Urine Negative Negative mg/dL    Specific Gravity Urine >1.050 (H) 1.001 - 1.030    Blood Urine Negative Negative    pH Urine 6.0 5.0 - 7.0    Protein Albumin Urine 10  (A) Negative mg/dL    Urobilinogen Urine <2.0 <2.0 mg/dL    Nitrite Urine Negative Negative    Leukocyte Esterase Urine Negative Negative    Mucus Urine Present (A) None Seen /LPF    RBC Urine <1 <=2 /HPF    WBC Urine <1 <=5 /HPF    Squamous Epithelials Urine <1 <=1 /HPF      IMPRESSION: Enteric tube is coiled in the hiatal hernia. Surgical clips are noted in the right upper quadrant. Partially imaged bowel  obstruction.    FINDINGS: Enteric tube placed without complication.?Tip in the body of the stomach below the diaphragm. Injection of Gastrografin, 120 mL for Gastrografin challenge..    Lab Results personally reviewed 9/5  Imaging Results personally reviewed 9/5    Sarai Gutiérrez DO  Hospitalist Service  [facility]  Text page via [facility] Paging/Directory     This note was created using dragon dictation, any spelling and grammatical errors are unintentional.

## 2022-09-05 NOTE — ED TRIAGE NOTES
Pt arrives via [facility] ambulance. She lives at home with her daughter. She woke on9/4 with stomach ache. She became nauseous and vomited multiple times. She reported seeing something red in her vomit but her daughter also states she ate beets and tomatoes yesterday. She ate red jello today. 911 called. She denied having any pain or nausea during the ems transport. She currently has nausea and pain across her abdomin.

## 2022-09-05 NOTE — PHARMACY-ADMISSION MEDICATION HISTORY
Pharmacy Note - Admission Medication History    Pertinent Provider Information: patient's daughter helps with medications at home     ______________________________________________________________________    Prior To Admission (PTA) med list completed and updated in EMR.       PTA Med List   Medication Sig Last Dose     fish oil-omega-3 fatty acids 1000 MG capsule Take 1 g by mouth daily 9/3/2022     hydrOXYzine (VISTARIL) 25 MG capsule Take 25 mg by mouth nightly as needed Past Month at Unknown time     levothyroxine (SYNTHROID/LEVOTHROID) 75 MCG tablet Take 75 mcg by mouth daily 9/3/2022     Misc Natural Products (PRO NUTRIENTS FRUIT & VEGGIE) TABS Take 1 capsule by mouth daily 9/3/2022     multivitamin, therapeutic (THERA-VIT) TABS tablet Take 1 tablet by mouth daily 9/3/2022     pantoprazole (PROTONIX) 40 MG EC tablet Take 40 mg by mouth daily 9/3/2022     vitamin C (ASCORBIC ACID) 500 MG tablet Take 500 mg by mouth daily 9/3/2022       Information source(s): Patient, Family member and [employer]\[facility] [place] [facility]\""  Method of interview communication: in-person    Summary of Changes to PTA Med List  New: ALL  Discontinued: none  Changed: none    Patient was asked about OTC/herbal products specifically.  PTA med list reflects this.    In the past week, patient estimated taking medication this percent of the time:  50-90% due to illness.    Allergies were reviewed, assessed, and updated with the patient.      Patient does not use any multi-dose medications prior to admission.    The information provided in this note is only as accurate as the sources available at the time of the update(s).    Thank you for the opportunity to participate in the care of this patient.    Sona Mata  9/5/2022 8:04 AM

## 2022-09-05 NOTE — CONSULTS
General Surgery Consult    Smita Pastor MRN# [id number]     Date of Admission: 9/4/2022    Reason for consult  SBO    History of Present Illness  Patient is a 89-year-old woman with a history of a symptomatic hiatal hernia and prior hysterectomy who presents to the emergency department with several days of abdominal pain.  The pain is diffuse perhaps worse in the lower abdomen.  She endorses vomiting and nausea.  She has had similar episode in the past which resolved on its own.  She has had an NG tube placed in the emergency department and has had some relief.    Past Medical History:  Past Medical History:   Diagnosis Date     Dyslipidemia      GERD (gastroesophageal reflux disease)      Hiatal hernia      Hypothyroidism 09/05/2022     Seasonal allergies      Wears hearing aid 06/14/2016     Past Surgical History:  Open hysterectomy and laparoscopic cholecystectomy  Past Surgical History:   Procedure Laterality Date     CHOLECYSTECTOMY       HYSTERECTOMY       Allergies:     Allergies   Allergen Reactions     Amoxicillin Swelling     Statins-Hmg-Coa Reductase Inhibitors [Hmg-Coa-R Inhibitors] Unknown     Medications:  No current facility-administered medications on file prior to encounter.  fish oil-omega-3 fatty acids 1000 MG capsule, Take 1 g by mouth daily  hydrOXYzine (VISTARIL) 25 MG capsule, Take 25 mg by mouth nightly as needed  levothyroxine (SYNTHROID/LEVOTHROID) 75 MCG tablet, Take 75 mcg by mouth daily  Misc Natural Products (PRO NUTRIENTS FRUIT & VEGGIE) TABS, Take 1 capsule by mouth daily  multivitamin, therapeutic (THERA-VIT) TABS tablet, Take 1 tablet by mouth daily  pantoprazole (PROTONIX) 40 MG EC tablet, Take 40 mg by mouth daily  vitamin C (ASCORBIC ACID) 500 MG tablet, Take 500 mg by mouth daily      Social History:  Her daughter is present  Social History     Socioeconomic History     Marital status:      Spouse name: Not on file     Number of children: Not on file     Years of  "education: Not on file     Highest education level: Not on file   Occupational History     Not on file   Tobacco Use     Smoking status: Never Smoker     Smokeless tobacco: Not on file   Substance and Sexual Activity     Alcohol use: Not Currently     Comment: occasional in the past     Drug use: Never     Sexual activity: Not on file   Other Topics Concern     Not on file   Social History Narrative     Not on file     Social Determinants of Health     Financial Resource Strain: Not on file   Food Insecurity: Not on file   Transportation Needs: Not on file   Physical Activity: Not on file   Stress: Not on file   Social Connections: Not on file   Intimate Partner Violence: Not on file   Housing Stability: Not on file     Family History:  Family History   Problem Relation Age of Onset     Tuberculosis Mother      Heart Disease Father      ROS:  12 point review negative except as stated in H&P    Exam:  /54   Pulse 85   Temp 98.6  F (37  C) (Oral)   Resp 23   Ht 1.6 m (5' 3\")   Wt 53.1 kg (117 lb)   SpO2 93%   BMI 20.73 kg/m    General: Thin elderly woman sitting up in bed in no acute distress  HEENT: Nonicteric sclera.  NG tube in place with brown output.  Resp: Non-labored breathing  Cardiac: RRR  Abdomen: Soft, moderately distended, nontender.  Well-healed lower midline incision.  There is a firm fullness over the right side of the most inferior portion of this incision.  With gentle pressure I am able to fully reduce this lump.    Labs:   Personally reviewed  Lactate and white blood cell count normal    Imaging:    Personally reviewed  IMPRESSION:   1.  Distal small bowel obstruction caused by a short segment of small bowel within a right low anterior abdominal wall hernia.  2.  Moderate size hiatal hernia.  3.  Small amount of ascites.    Assessment: 89 year old female presenting with a small bowel obstruction secondary to a loop of small intestine and a incisional hernia.  This was easily reducible " at bedside.  She does not have an acute abdomen.  I expect that she will improve with this reduction.    I had a discussion with her and her daughter about hernia repair.  Normally I would advocate for repair however due to the patient's age and the lack of symptoms for the last 50 years I think it would be reasonable to see how she does.  The patient agrees.  She is uninterested in any kind of surgery right now.  If this becomes a recurrent problem we may have to have a another discussion    Plan:   -NG tube with Gastrografin challenge.  If she passes or begins having bowel function would be okay to remove the tube and advance diet to clears.  -IV fluids    Dewayne Darby MD  General Surgeon  [facility]  Surgery [facility] - [address] [address]?  Office: [phone]  Pager: [phone]

## 2022-09-06 ENCOUNTER — APPOINTMENT (OUTPATIENT)
Dept: PHYSICAL THERAPY | Facility: HOSPITAL | Age: 87
End: 2022-09-06
Attending: FAMILY MEDICINE
Payer: MEDICARE

## 2022-09-06 VITALS
OXYGEN SATURATION: 94 % | HEIGHT: 63 IN | DIASTOLIC BLOOD PRESSURE: 55 MMHG | HEART RATE: 68 BPM | BODY MASS INDEX: 19.88 KG/M2 | TEMPERATURE: 98.3 F | SYSTOLIC BLOOD PRESSURE: 106 MMHG | WEIGHT: 112.2 LBS | RESPIRATION RATE: 18 BRPM

## 2022-09-06 PROCEDURE — 258N000003 HC RX IP 258 OP 636: Performed by: INTERNAL MEDICINE

## 2022-09-06 PROCEDURE — 96372 THER/PROPH/DIAG INJ SC/IM: CPT | Performed by: INTERNAL MEDICINE

## 2022-09-06 PROCEDURE — 99217 PR OBSERVATION CARE DISCHARGE: CPT | Performed by: FAMILY MEDICINE

## 2022-09-06 PROCEDURE — 96376 TX/PRO/DX INJ SAME DRUG ADON: CPT

## 2022-09-06 PROCEDURE — C9113 INJ PANTOPRAZOLE SODIUM, VIA: HCPCS | Performed by: INTERNAL MEDICINE

## 2022-09-06 PROCEDURE — 250N000011 HC RX IP 250 OP 636: Performed by: INTERNAL MEDICINE

## 2022-09-06 PROCEDURE — G0378 HOSPITAL OBSERVATION PER HR: HCPCS

## 2022-09-06 PROCEDURE — 99212 OFFICE O/P EST SF 10 MIN: CPT | Performed by: SURGERY

## 2022-09-06 PROCEDURE — 97116 GAIT TRAINING THERAPY: CPT | Mod: GP

## 2022-09-06 PROCEDURE — 97161 PT EVAL LOW COMPLEX 20 MIN: CPT | Mod: GP

## 2022-09-06 RX ADMIN — SODIUM CHLORIDE, POTASSIUM CHLORIDE, SODIUM LACTATE AND CALCIUM CHLORIDE: 600; 310; 30; 20 INJECTION, SOLUTION INTRAVENOUS at 02:02

## 2022-09-06 RX ADMIN — PANTOPRAZOLE SODIUM 40 MG: 40 INJECTION, POWDER, FOR SOLUTION INTRAVENOUS at 08:46

## 2022-09-06 RX ADMIN — ENOXAPARIN SODIUM 30 MG: 30 INJECTION SUBCUTANEOUS at 08:47

## 2022-09-06 ASSESSMENT — ACTIVITIES OF DAILY LIVING (ADL)
ADLS_ACUITY_SCORE: 28

## 2022-09-06 NOTE — PROGRESS NOTES
General Surgery Progress Note:    Hospital Day # 1    ASSESSMENT:   1. SBO (small bowel obstruction) (H)        Smita Pastor is a 89 year old female sbo. GG to colon and having bm.      PLAN:   NG out which patient pulled already  Clears and advance diet as tolerated  We will sign off. Call if any questions.   Discharge per medicine    IJuvencio D.O. have seen and evaluated Smita Pastor today as part of a shared APRN/PA visit. I reviewed the Advance Practice Practitioner's history and physical exam as well as the diagnosis and plan.     My key exam findings include small bowel obstruction secondary to incisional hernia.  Feels well, abdomen soft, nontender    Key management decisions made by me and carried out under my direction include:  Resolution of small bowel obstruction.  No plans for surgical management at this time.  Surgery will sign off.  Please call with any questions or concerns.    I spent 10 minutes face-to-face and/or coordinating care. Over 50% of our time on the unit was spent counseling the patient and/or coordinating care regarding her small bowel obstruction.    Juvencio Tijerina DO [facility]  [phone]          SUBJECTIVE:   Smita Pastor is doing well. Pulled NG out. Having BMS. No pain. No nausaea.     Patient Vitals for the past 24 hrs:   BP Temp Temp src Pulse Resp SpO2 Weight   09/06/22 0704 106/55 98.3  F (36.8  C) Oral 68 18 94 % --   09/06/22 0030 124/65 98.9  F (37.2  C) Oral 71 16 94 % --   09/05/22 1645 107/54 98.4  F (36.9  C) Oral 78 18 93 % --   09/05/22 1639 -- -- -- -- -- -- 50.9 kg (112 lb 3.2 oz)   09/05/22 1400 111/55 -- -- 74 18 92 % --   09/05/22 1330 111/58 -- -- 80 18 94 % --   09/05/22 1030 105/55 -- -- 82 19 95 % --   09/05/22 0840 108/58 -- -- 81 30 94 % --       Physical Exam:  General: NAD, pleasant    ABD: soft nontender  EXT:no CCE    Admission on 09/04/2022   Component Date Value     Sodium 09/05/2022 139      Potassium 09/05/2022 4.1      Chloride  09/05/2022 106      Carbon Dioxide (CO2) 09/05/2022 23      Anion Gap 09/05/2022 10      Urea Nitrogen 09/05/2022 21      Creatinine 09/05/2022 1.12 (A)     Calcium 09/05/2022 9.1      Glucose 09/05/2022 129 (A)     Alkaline Phosphatase 09/05/2022 72      AST 09/05/2022 17      ALT 09/05/2022 11      Protein Total 09/05/2022 7.3      Albumin 09/05/2022 3.7      Bilirubin Total 09/05/2022 0.5      GFR Estimate 09/05/2022 44 (A)     Lipase 09/04/2022 26      Troponin I 09/04/2022 0.02      Magnesium 09/05/2022 2.0      Color Urine 09/05/2022 Light Yellow      Appearance Urine 09/05/2022 Clear      Glucose Urine 09/05/2022 Negative      Bilirubin Urine 09/05/2022 Negative      Ketones Urine 09/05/2022 Negative      Specific Gravity Urine 09/05/2022 >1.050 (A)     Blood Urine 09/05/2022 Negative      pH Urine 09/05/2022 6.0      Protein Albumin Urine 09/05/2022 10  (A)     Urobilinogen Urine 09/05/2022 <2.0      Nitrite Urine 09/05/2022 Negative      Leukocyte Esterase Urine 09/05/2022 Negative      Mucus Urine 09/05/2022 Present (A)     RBC Urine 09/05/2022 <1      WBC Urine 09/05/2022 <1      Squamous Epithelials Uri* 09/05/2022 <1      WBC Count 09/04/2022 9.5      RBC Count 09/04/2022 4.77      Hemoglobin 09/04/2022 14.3      Hematocrit 09/04/2022 43.2      MCV 09/04/2022 91      MCH 09/04/2022 30.0      MCHC 09/04/2022 33.1      RDW 09/04/2022 14.6      Platelet Count 09/04/2022 178      % Neutrophils 09/04/2022 83      % Lymphocytes 09/04/2022 11      % Monocytes 09/04/2022 6      % Eosinophils 09/04/2022 0      % Basophils 09/04/2022 0      % Immature Granulocytes 09/04/2022 0      NRBCs per 100 WBC 09/04/2022 0      Absolute Neutrophils 09/04/2022 7.9      Absolute Lymphocytes 09/04/2022 1.0      Absolute Monocytes 09/04/2022 0.6      Absolute Eosinophils 09/04/2022 0.0      Absolute Basophils 09/04/2022 0.0      Absolute Immature Granul* 09/04/2022 0.0      Absolute NRBCs 09/04/2022 0.0      Platelet Assessment  09/04/2022 Automated Count Confirmed. Platelet morphology is normal.      Acanthocytes 09/04/2022 Slight (A)     [place] Cells 09/04/2022 Slight (A)     RBC Morphology 09/04/2022 Confirmed RBC Indices      Hold Specimen 09/05/2022 JIC      SARS CoV2 PCR 09/05/2022 Negative      Lactic Acid 09/05/2022 1.0         YAZAN MosqueraC  [facility] Surgery [facility]  [address] [place] [address]  Phone- [phone]  Fax- [phone]

## 2022-09-06 NOTE — PLAN OF CARE
Problem: Plan of Care - These are the overarching goals to be used throughout the patient stay.    Goal: Plan of Care Review/Shift Note  Description: The Plan of Care Review/Shift note should be completed every shift.  The Outcome Evaluation is a brief statement about your assessment that the patient is improving, declining, or no change.  This information will be displayed automatically on your shift note.  Outcome: Ongoing, Progressing   Goal Outcome Evaluation:      Patient denied nausea or abdominal pain and reports passing gas. Tolerating sips of clear liquids. NG is in place but clamped. If fluids infusing.

## 2022-09-06 NOTE — PROGRESS NOTES
Care Management Follow Up    Received Code 44 notification at 1351. Attempted to see patient but she had discharged at 1300.      Zainab Farnsworth RN

## 2022-09-06 NOTE — UTILIZATION REVIEW
"Admission Status; Secondary Review Determination     Under the authority of the Utilization Management Committee, the utilization review process indicated a secondary review on Smita Pastor.  The review outcome is based on review of the medical records, discussions with staff, and applying clinical experience noted on the date of the review.     (x) Observation Status Appropriate - This patient does not meet hospital inpatient criteria and is placed in observation status. If this patient's primary payer is Medicare and was admitted as an inpatient, Condition Code 44 should be used and patient status changed to \"observation\".     RATIONALE FOR DETERMINATION   89 years old female with PMH of  GERD, hypothyroid, [place], and seasonal allergies who presents with an acute SBO at the level of a abdominal wall hernia. Improved , NG is removed surgical consult , stable for discharge     The severity of illness, intensity of service provided, expected LOS and risk for adverse outcome make the care appropriate for further observation; however, doesn't meet criteria for hospital inpatient admission. Dr Gutiérrez is notified of this determination and agrees with downgrade of status.      The information on this document is developed by the utilization review team in order for the business office to ensure compliance.  This only denotes the appropriateness of proper admission status and does not reflect the quality of care rendered.         The definitions of Inpatient Status and Observation Status used in making the determination above are those provided in the CMS Coverage Manual, Chapter 1 and Chapter 6, section 70.4.      Sincerely,  Gustavo Rojas MD  Utilization Review  Physician Advisor  [facility]  "

## 2022-09-06 NOTE — PROGRESS NOTES
Went through AVS with patient and patient's daughter. Answered any questions and concerns the two had. All belongings sent with patient. No medications to be returned.     Pt ate low-fiber diet for lunch, tolerated well.     KIKA CAO RN

## 2022-09-06 NOTE — PROGRESS NOTES
Physical Therapy Discharge Summary    Reason for therapy discharge:    All goals and outcomes met, no further needs identified.    Progress towards therapy goal(s). See goals on Care Plan in [facility] electronic health record for goal details.  Goals met. Pt appears at baseline functional mobility. Performs transfers and ambulation using FWW with SBA. Pt's dtr to assist at home. No further acute PT services indicated. Will complete orders.     Therapy recommendation(s):    Continue home exercise program.      Carrie Tellez, PT, DPT  9/6/2022 09/06/22 1105   Quick Adds   Type of Visit Initial PT Evaluation   Living Environment   People in Home child(ynes), adult  (dtr)   Current Living Arrangements house   Home Accessibility no concerns  (stair lift inside the home)   Transportation Anticipated family or friend will provide   Self-Care   Usual Activity Tolerance good   Current Activity Tolerance good   Equipment Currently Used at Home cane, straight;walker, rolling  (4WW)   General Information   Onset of Illness/Injury or Date of Surgery 09/04/22   Referring Physician Sarai Gutiérrez, DO   Patient/Family Therapy Goals Statement (PT) return home today   Pertinent History of Current Problem (include personal factors and/or comorbidities that impact the POC) SBO   Existing Precautions/Restrictions no known precautions/restrictions   Strength (Manual Muscle Testing)   Strength (Manual Muscle Testing) No deficits observed during functional mobility   Bed Mobility   Bed Mobility sit-supine   Sit-Supine [place] (Bed Mobility) supervision;verbal cues   Assistive Device (Bed Mobility) bed rails   Transfers   Transfers sit-stand transfer;toilet transfer   Sit-Stand Transfer   Sit-Stand [place] (Transfers) supervision   Assistive Device (Sit-Stand Transfers) walker, front-wheeled   Toilet Transfer   [place] Level (Toilet Transfer) supervision   Type (Toilet Transfer) sit-stand   Gait/Stairs (Locomotion)    Center Level (Gait) supervision;verbal cues   Assistive Device (Gait) walker, front-wheeled   Distance in Feet (Required for LE Total Joints) 150'   Pattern (Gait) step-through   Clinical Impression   Criteria for Skilled Therapeutic Intervention Yes, treatment indicated   PT Diagnosis (PT) Impaired functional mobility   Influenced by the following impairments Fatigue   Functional limitations due to impairments Impaired endurance   Clinical Presentation (PT Evaluation Complexity) Stable/Uncomplicated   Clinical Presentation Rationale Presents as diagnosed   Clinical Decision Making (Complexity) low complexity   Planned Therapy Interventions (PT) gait training;home exercise program   Anticipated Equipment Needs at Discharge (PT) walker, rolling   Risk & Benefits of therapy have been explained patient;daughter   PT Discharge Planning   PT Discharge Recommendation (DC Rec) home with assist   PT Brief overview of current status Eval + tx only. Pt appears at baseline mobility.   Total Evaluation Time   Total Evaluation Time (Minutes) 10   Physical Therapy Goals   PT Frequency One time eval and treatment only   PT Predicted Duration/Target Date for Goal Attainment 09/06/22   PT Goals Gait;PT Goal 1   PT: Gait Supervision/stand-by assist;Rolling walker;Greater than 200 feet   PT: Goal 1 Demonstrate understanding of LE HEP to improve overall strength for mobility.

## 2022-09-06 NOTE — PLAN OF CARE
Problem: Plan of Care - These are the overarching goals to be used throughout the patient stay.    Goal: Absence of Hospital-Acquired Illness or Injury  Intervention: Identify and Manage Fall Risk  Recent Flowsheet Documentation  Taken 9/5/2022 4716 by Krystyna Nation RN  Safety Promotion/Fall Prevention:   assistive device/personal items within reach   bed alarm on   activity supervised   fall prevention program maintained   room door open   room near nurse's station   safety round/check completed   Goal Outcome Evaluation: No fall overnight. A&O x4. Set off bed alarm x1 to go to bathroom.     Patient pulled NG tube herself. Bowel sounds positive & passing flatus. Denies pain.

## 2022-09-06 NOTE — DISCHARGE SUMMARY
[facility] MEDICINE  DISCHARGE SUMMARY  [facility]     Primary Care Physician: Jordyn, [facility]  Admission Date: 9/4/2022   Discharge Provider: Sarai Gutiérrez DO Discharge Date: 9/6/2022   Diet: regular   Code Status: No CPR- Do NOT Intubate   Activity: as tolerated with assistance        Condition at Discharge: stable      REASON FOR PRESENTATION(See Admission Note for Details)   89-year-old female with HTN, hypothyroidism, mood disorder presents with small bowel obstruction.    PRINCIPAL & ACTIVE DISCHARGE DIAGNOSES     Principal Problem:    SBO (small bowel obstruction) (H)  Active Problems:    Hypothyroidism    Wears hearing aid    Seasonal allergies    GERD (gastroesophageal reflux disease)    CKD (chronic kidney disease) stage 3, GFR 30-59 ml/min (H)      SIGNIFICANT FINDINGS (Imaging, labs):     Impression:     IMPRESSION:   1.  Distal small bowel obstruction caused by a short segment of small bowel within a right low anterior abdominal wall hernia.   2.  Moderate size hiatal hernia.   3.  Small amount of ascites.     Impression:     IMPRESSION: Enteric tube is coiled in the hiatal hernia. Surgical clips are noted in the right upper quadrant. Partially imaged bowel obstruction.     INDICATION: Advanced feeding tube beyond hiatal hernia.   COMPARISON: None.     TECHNIQUE: Routine.     FINDINGS: Enteric tube placed without complication.?Tip in the body of the stomach below the diaphragm. Injection of Gastrografin, 120 mL for Gastrografin challenge..     FLUOROSCOPIC TIME: .6 minutes.   NUMBER OF IMAGES: 1.     Impression:     IMPRESSION: Administered oral contrast reached the colon by 8 hours.       PENDING LABS     [unfilled]      PROCEDURES ( this hospitalization only)          RECOMMENDATIONS TO OUTPATIENT PROVIDER FOR F/U VISIT     Follow up with primary care within 1 week    DISPOSITION     Home    SUMMARY OF HOSPITAL COURSE:      Small bowel  obstruction  Patient presented with abdominal pain in the setting of known abdominal wall hernia.  CT abdomen and pelvis with distal small bowel obstruction caused by short segment of small bowel within the right lower anterior abdominal wall hernia, moderate size hiatal hernia, small amount of ascites.  Patient otherwise hemodynamically stable, afebrile, normal lactic acid, no leukocytosis and no signs of infection or bowel ischemia/perforation.  NG tube placed in the emergency department.  Small bowel follow through 9/5 with contrast throughout entirety of colon so NG tube removed.  Patient pain free, tolerating low fiber diet.     CKD 3  Baseline creatinine appears to be 1.1     Hypothyroidism  TSH 3.23, continue home levothyroxine     GERD  Continue home pantoprazole     Mood disorder  Hydroxyzine PRN    Discharge Medications with Med changes:        Review of your medicines      CONTINUE these medicines which have NOT CHANGED      Dose / Directions   fish oil-omega-3 fatty acids 1000 MG capsule      Dose: 1 g  Take 1 g by mouth daily  Refills: 0     hydrOXYzine 25 MG capsule  Commonly known as: VISTARIL      Dose: 25 mg  Take 25 mg by mouth nightly as needed  Refills: 0     levothyroxine 75 MCG tablet  Commonly known as: SYNTHROID/LEVOTHROID      Dose: 75 mcg  Take 75 mcg by mouth daily before breakfast  Refills: 0     multivitamin, therapeutic Tabs tablet      Dose: 1 tablet  Take 1 tablet by mouth daily  Refills: 0     pantoprazole 40 MG EC tablet  Commonly known as: PROTONIX      Dose: 40 mg  Take 40 mg by mouth daily before breakfast  Refills: 0     Pro Nutrients Fruit & Veggie Tabs      Dose: 1 capsule  Take 1 capsule by mouth daily  Refills: 0     vitamin C 500 MG tablet  Commonly known as: ASCORBIC ACID      Dose: 500 mg  Take 500 mg by mouth daily  Refills: 0                Rationale for medication changes:            Consults   Genera surgery      Immunizations given this encounter  "    [unfilled]       Anticoagulation Information      Recent INR results: No results for input(s): INR in the last 168 hours.      Discharge Orders     Discharge Procedure Orders   Reason for your hospital stay   Order Comments: Small bowel obstruction     Follow-up and recommended labs and tests    Order Comments: Follow up with primary care provider, [facility], within 7 days for hospital follow- up.  No follow up labs or test are needed.     Activity   Order Comments: Your activity upon discharge: activity as tolerated with assistance     Order Specific Question Answer Comments   Is discharge order? Yes      Diet   Order Comments: Follow this diet upon discharge: Orders Placed This Encounter      Advance Diet as Tolerated: Low Fiber     Order Specific Question Answer Comments   Is discharge order? Yes      Examination     Vital signs:  Temp: 98.3  F (36.8  C) Temp src: Oral BP: 106/55 Pulse: 68   Resp: 18 SpO2: 94 % O2 Device: None (Room air)   Height: 160 cm (5' 3\") Weight: 50.9 kg (112 lb 3.2 oz)  Estimated body mass index is 19.88 kg/m  as calculated from the following:    Height as of this encounter: 1.6 m (5' 3\").    Weight as of this encounter: 50.9 kg (112 lb 3.2 oz).         Physical Examination:   General appearance - alert, well appearing and hard of hearing, and in no distress and oriented to person, place, and time  Mental status - alert, oriented to person, place, and time, normal mood, behavior, speech, dress, motor activity, and thought processes  HEENT - sclera anicteric, left eye normal, right eye normal, nares normal and patent, no erythema,  Lymphatics - no palpable lymphadenopathy, no hepatosplenomegaly  Respiratory - no tachypnea, retractions or cyanosis  Abdomen - soft, nontender, nondistended, no masses or organomegaly no rebound tenderness noted bowel sounds normal, no bladder distension noted  Neurological - alert, oriented, normal speech, no focal findings or " movement disorder noted  Musculoskeletal - no joint tenderness, deformity or swelling, full range of motion without pain  Extremities - peripheral pulses normal, no pedal edema, no clubbing or cyanosis  Skin - normal coloration and turgor, no rashes, no suspicious skin lesions noted      Please see EMR for more detailed significant labs, imaging, consultant notes etc.  Total time spent on discharge: 50 minutes    Sarai Gutiérrez DO    [facility]: Ph:[phone]    CC:[facility]

## 2022-09-08 ENCOUNTER — PATIENT OUTREACH (OUTPATIENT)
Dept: CARE COORDINATION | Facility: CLINIC | Age: 87
End: 2022-09-08

## 2022-09-08 NOTE — PROGRESS NOTES
Clinic Care Coordination Contact  [facility]/Voicemail       Clinical Data: Care Coordinator Outreach  Outreach attempted x 2.  Left message on patient's voicemail with call back information and requested return call.  Plan:  Care Coordinator will do no further outreaches at this time.    Apryl LUBIN Community Health Worker  Clinic Care Coordination  [facility]  Phone: [phone]

## 2025-07-01 ENCOUNTER — APPOINTMENT (OUTPATIENT)
Dept: CT IMAGING | Facility: HOSPITAL | Age: OVER 89
DRG: 316 | End: 2025-07-01
Attending: EMERGENCY MEDICINE
Payer: MEDICARE

## 2025-07-01 ENCOUNTER — HOSPITAL ENCOUNTER (INPATIENT)
Facility: HOSPITAL | Age: OVER 89
LOS: 2 days | Discharge: HOME | End: 2025-07-03
Attending: EMERGENCY MEDICINE
Payer: MEDICARE

## 2025-07-01 DIAGNOSIS — N39.0 URINARY TRACT INFECTION WITHOUT HEMATURIA, SITE UNSPECIFIED: ICD-10-CM

## 2025-07-01 DIAGNOSIS — I21.4 NSTEMI (NON-ST ELEVATED MYOCARDIAL INFARCTION) (H): ICD-10-CM

## 2025-07-01 DIAGNOSIS — I50.21 ACUTE SYSTOLIC HEART FAILURE (H): Primary | ICD-10-CM

## 2025-07-01 LAB
ALBUMIN SERPL BCG-MCNC: 4.1 G/DL (ref 3.5–5.2)
ALBUMIN UR-MCNC: 50 MG/DL
ALP SERPL-CCNC: 85 U/L (ref 40–150)
ALT SERPL W P-5'-P-CCNC: 9 U/L (ref 0–50)
ANION GAP SERPL CALCULATED.3IONS-SCNC: 13 MMOL/L (ref 7–15)
APPEARANCE UR: CLEAR
AST SERPL W P-5'-P-CCNC: 28 U/L (ref 0–45)
BACTERIA #/AREA URNS HPF: ABNORMAL /HPF
BASOPHILS # BLD AUTO: 0 10E3/UL (ref 0–0.2)
BASOPHILS NFR BLD AUTO: 0 %
BILIRUB DIRECT SERPL-MCNC: <0.08 MG/DL (ref 0–0.3)
BILIRUB SERPL-MCNC: 0.5 MG/DL
BILIRUB UR QL STRIP: NEGATIVE
BUN SERPL-MCNC: 18.3 MG/DL (ref 8–23)
CALCIUM SERPL-MCNC: 9.1 MG/DL (ref 8.8–10.4)
CHLORIDE SERPL-SCNC: 104 MMOL/L (ref 98–107)
COLOR UR AUTO: YELLOW
CREAT SERPL-MCNC: 0.85 MG/DL (ref 0.51–0.95)
EGFRCR SERPLBLD CKD-EPI 2021: 60 ML/MIN/1.73M2
EOSINOPHIL # BLD AUTO: 0 10E3/UL (ref 0–0.7)
EOSINOPHIL NFR BLD AUTO: 0 %
ERYTHROCYTE [DISTWIDTH] IN BLOOD BY AUTOMATED COUNT: 13.3 % (ref 10–15)
EST. AVERAGE GLUCOSE BLD GHB EST-MCNC: 103 MG/DL
GLUCOSE SERPL-MCNC: 126 MG/DL (ref 70–99)
GLUCOSE UR STRIP-MCNC: NEGATIVE MG/DL
HBA1C MFR BLD: 5.2 %
HCO3 SERPL-SCNC: 26 MMOL/L (ref 22–29)
HCT VFR BLD AUTO: 42.3 % (ref 35–47)
HGB BLD-MCNC: 13.5 G/DL (ref 11.7–15.7)
HGB UR QL STRIP: NEGATIVE
HYALINE CASTS: 5 /LPF
IMM GRANULOCYTES # BLD: 0 10E3/UL
IMM GRANULOCYTES NFR BLD: 0 %
KETONES UR STRIP-MCNC: 20 MG/DL
LEUKOCYTE ESTERASE UR QL STRIP: ABNORMAL
LIPASE SERPL-CCNC: 26 U/L (ref 13–60)
LYMPHOCYTES # BLD AUTO: 1.3 10E3/UL (ref 0.8–5.3)
LYMPHOCYTES NFR BLD AUTO: 13 %
MCH RBC QN AUTO: 30.9 PG (ref 26.5–33)
MCHC RBC AUTO-ENTMCNC: 31.9 G/DL (ref 31.5–36.5)
MCV RBC AUTO: 97 FL (ref 78–100)
MONOCYTES # BLD AUTO: 0.7 10E3/UL (ref 0–1.3)
MONOCYTES NFR BLD AUTO: 7 %
MUCOUS THREADS #/AREA URNS LPF: PRESENT /LPF
NEUTROPHILS # BLD AUTO: 8.3 10E3/UL (ref 1.6–8.3)
NEUTROPHILS NFR BLD AUTO: 79 %
NITRATE UR QL: NEGATIVE
NRBC # BLD AUTO: 0 10E3/UL
NRBC BLD AUTO-RTO: 0 /100
PH UR STRIP: 5.5 [PH] (ref 5–7)
PLATELET # BLD AUTO: 185 10E3/UL (ref 150–450)
POTASSIUM SERPL-SCNC: 4.1 MMOL/L (ref 3.4–5.3)
PROT SERPL-MCNC: 6.9 G/DL (ref 6.4–8.3)
RBC # BLD AUTO: 4.37 10E6/UL (ref 3.8–5.2)
RBC URINE: 4 /HPF
SODIUM SERPL-SCNC: 143 MMOL/L (ref 135–145)
SP GR UR STRIP: 1.03 (ref 1–1.03)
SQUAMOUS EPITHELIAL: 3 /HPF
TRANSITIONAL EPI: 1 /HPF
TROPONIN T SERPL HS-MCNC: 707 NG/L
TROPONIN T SERPL HS-MCNC: 773 NG/L
UROBILINOGEN UR STRIP-MCNC: NORMAL MG/DL
WBC # BLD AUTO: 10.5 10E3/UL (ref 4–11)
WBC URINE: 22 /HPF

## 2025-07-01 PROCEDURE — 87186 SC STD MICRODIL/AGAR DIL: CPT | Performed by: EMERGENCY MEDICINE

## 2025-07-01 PROCEDURE — 96375 TX/PRO/DX INJ NEW DRUG ADDON: CPT

## 2025-07-01 PROCEDURE — 250N000011 HC RX IP 250 OP 636: Performed by: EMERGENCY MEDICINE

## 2025-07-01 PROCEDURE — 96374 THER/PROPH/DIAG INJ IV PUSH: CPT | Mod: 59

## 2025-07-01 PROCEDURE — 83036 HEMOGLOBIN GLYCOSYLATED A1C: CPT

## 2025-07-01 PROCEDURE — 96361 HYDRATE IV INFUSION ADD-ON: CPT

## 2025-07-01 PROCEDURE — 85520 HEPARIN ASSAY: CPT

## 2025-07-01 PROCEDURE — 99291 CRITICAL CARE FIRST HOUR: CPT | Mod: 25

## 2025-07-01 PROCEDURE — 250N000013 HC RX MED GY IP 250 OP 250 PS 637: Performed by: EMERGENCY MEDICINE

## 2025-07-01 PROCEDURE — 80048 BASIC METABOLIC PNL TOTAL CA: CPT | Performed by: EMERGENCY MEDICINE

## 2025-07-01 PROCEDURE — 82248 BILIRUBIN DIRECT: CPT | Performed by: EMERGENCY MEDICINE

## 2025-07-01 PROCEDURE — 81001 URINALYSIS AUTO W/SCOPE: CPT | Performed by: EMERGENCY MEDICINE

## 2025-07-01 PROCEDURE — 82565 ASSAY OF CREATININE: CPT | Performed by: EMERGENCY MEDICINE

## 2025-07-01 PROCEDURE — 84484 ASSAY OF TROPONIN QUANT: CPT | Performed by: EMERGENCY MEDICINE

## 2025-07-01 PROCEDURE — 120N000004 HC R&B MS OVERFLOW

## 2025-07-01 PROCEDURE — 36415 COLL VENOUS BLD VENIPUNCTURE: CPT | Performed by: EMERGENCY MEDICINE

## 2025-07-01 PROCEDURE — 83690 ASSAY OF LIPASE: CPT | Performed by: EMERGENCY MEDICINE

## 2025-07-01 PROCEDURE — 258N000003 HC RX IP 258 OP 636: Performed by: EMERGENCY MEDICINE

## 2025-07-01 PROCEDURE — 258N000003 HC RX IP 258 OP 636

## 2025-07-01 PROCEDURE — 71275 CT ANGIOGRAPHY CHEST: CPT

## 2025-07-01 PROCEDURE — 36415 COLL VENOUS BLD VENIPUNCTURE: CPT

## 2025-07-01 PROCEDURE — 93005 ELECTROCARDIOGRAM TRACING: CPT | Performed by: EMERGENCY MEDICINE

## 2025-07-01 PROCEDURE — 85025 COMPLETE CBC W/AUTO DIFF WBC: CPT | Performed by: EMERGENCY MEDICINE

## 2025-07-01 RX ORDER — AMOXICILLIN 250 MG
2 CAPSULE ORAL 2 TIMES DAILY PRN
Status: DISCONTINUED | OUTPATIENT
Start: 2025-07-01 | End: 2025-07-03 | Stop reason: HOSPADM

## 2025-07-01 RX ORDER — ONDANSETRON 2 MG/ML
4 INJECTION INTRAMUSCULAR; INTRAVENOUS ONCE
Status: COMPLETED | OUTPATIENT
Start: 2025-07-01 | End: 2025-07-01

## 2025-07-01 RX ORDER — LEVOFLOXACIN 5 MG/ML
750 INJECTION, SOLUTION INTRAVENOUS ONCE
Status: COMPLETED | OUTPATIENT
Start: 2025-07-01 | End: 2025-07-01

## 2025-07-01 RX ORDER — AMOXICILLIN 250 MG
1 CAPSULE ORAL 2 TIMES DAILY PRN
Status: DISCONTINUED | OUTPATIENT
Start: 2025-07-01 | End: 2025-07-03 | Stop reason: HOSPADM

## 2025-07-01 RX ORDER — ASPIRIN 325 MG
325 TABLET ORAL ONCE
Status: COMPLETED | OUTPATIENT
Start: 2025-07-01 | End: 2025-07-01

## 2025-07-01 RX ORDER — ONDANSETRON 4 MG/1
4 TABLET, ORALLY DISINTEGRATING ORAL EVERY 6 HOURS PRN
Status: DISCONTINUED | OUTPATIENT
Start: 2025-07-01 | End: 2025-07-03 | Stop reason: HOSPADM

## 2025-07-01 RX ORDER — LIDOCAINE 40 MG/G
CREAM TOPICAL
Status: DISCONTINUED | OUTPATIENT
Start: 2025-07-01 | End: 2025-07-03 | Stop reason: HOSPADM

## 2025-07-01 RX ORDER — ACETAMINOPHEN 325 MG/1
650 TABLET ORAL EVERY 4 HOURS PRN
Status: DISCONTINUED | OUTPATIENT
Start: 2025-07-01 | End: 2025-07-03 | Stop reason: HOSPADM

## 2025-07-01 RX ORDER — LEVOTHYROXINE SODIUM 25 UG/1
75 TABLET ORAL
Status: DISCONTINUED | OUTPATIENT
Start: 2025-07-02 | End: 2025-07-03 | Stop reason: HOSPADM

## 2025-07-01 RX ORDER — HYDROXYZINE HYDROCHLORIDE 25 MG/1
25 TABLET, FILM COATED ORAL
Status: DISCONTINUED | OUTPATIENT
Start: 2025-07-01 | End: 2025-07-03 | Stop reason: HOSPADM

## 2025-07-01 RX ORDER — HEPARIN SODIUM 10000 [USP'U]/100ML
0-5000 INJECTION, SOLUTION INTRAVENOUS CONTINUOUS
Status: DISCONTINUED | OUTPATIENT
Start: 2025-07-01 | End: 2025-07-02

## 2025-07-01 RX ORDER — FEXOFENADINE HCL 180 MG/1
180 TABLET ORAL EVERY MORNING
Status: DISCONTINUED | OUTPATIENT
Start: 2025-07-02 | End: 2025-07-03 | Stop reason: HOSPADM

## 2025-07-01 RX ORDER — ACETAMINOPHEN 500 MG
1000 TABLET ORAL 3 TIMES DAILY
COMMUNITY

## 2025-07-01 RX ORDER — IOPAMIDOL 755 MG/ML
75 INJECTION, SOLUTION INTRAVASCULAR ONCE
Status: COMPLETED | OUTPATIENT
Start: 2025-07-01 | End: 2025-07-01

## 2025-07-01 RX ORDER — ACETAMINOPHEN 650 MG/1
650 SUPPOSITORY RECTAL EVERY 4 HOURS PRN
Status: DISCONTINUED | OUTPATIENT
Start: 2025-07-01 | End: 2025-07-03 | Stop reason: HOSPADM

## 2025-07-01 RX ORDER — FEXOFENADINE HCL 180 MG/1
180 TABLET ORAL EVERY MORNING
COMMUNITY

## 2025-07-01 RX ORDER — SODIUM CHLORIDE 9 MG/ML
INJECTION, SOLUTION INTRAVENOUS CONTINUOUS
Status: DISCONTINUED | OUTPATIENT
Start: 2025-07-01 | End: 2025-07-02

## 2025-07-01 RX ORDER — PROCHLORPERAZINE MALEATE 5 MG/1
5 TABLET ORAL EVERY 6 HOURS PRN
Status: DISCONTINUED | OUTPATIENT
Start: 2025-07-01 | End: 2025-07-03 | Stop reason: HOSPADM

## 2025-07-01 RX ORDER — PANTOPRAZOLE SODIUM 40 MG/1
40 TABLET, DELAYED RELEASE ORAL
Status: DISCONTINUED | OUTPATIENT
Start: 2025-07-02 | End: 2025-07-03 | Stop reason: HOSPADM

## 2025-07-01 RX ORDER — ONDANSETRON 2 MG/ML
4 INJECTION INTRAMUSCULAR; INTRAVENOUS EVERY 6 HOURS PRN
Status: DISCONTINUED | OUTPATIENT
Start: 2025-07-01 | End: 2025-07-03 | Stop reason: HOSPADM

## 2025-07-01 RX ADMIN — SODIUM CHLORIDE 500 ML: 0.9 INJECTION, SOLUTION INTRAVENOUS at 15:28

## 2025-07-01 RX ADMIN — SODIUM CHLORIDE 75 ML/HR: 0.9 INJECTION, SOLUTION INTRAVENOUS at 19:43

## 2025-07-01 RX ADMIN — IOPAMIDOL 75 ML: 755 INJECTION, SOLUTION INTRAVENOUS at 16:56

## 2025-07-01 RX ADMIN — LEVOFLOXACIN 750 MG: 5 INJECTION, SOLUTION INTRAVENOUS at 17:39

## 2025-07-01 RX ADMIN — ASPIRIN 325 MG: 325 TABLET ORAL at 17:13

## 2025-07-01 RX ADMIN — HEPARIN SODIUM 650 UNITS/HR: 10000 INJECTION, SOLUTION INTRAVENOUS at 17:38

## 2025-07-01 RX ADMIN — ONDANSETRON 4 MG: 2 INJECTION, SOLUTION INTRAMUSCULAR; INTRAVENOUS at 15:28

## 2025-07-01 ASSESSMENT — ACTIVITIES OF DAILY LIVING (ADL)
ADLS_ACUITY_SCORE: 52
ADLS_ACUITY_SCORE: 45
ADLS_ACUITY_SCORE: 52
ADLS_ACUITY_SCORE: 45
ADLS_ACUITY_SCORE: 54
ADLS_ACUITY_SCORE: 45
ADLS_ACUITY_SCORE: 52

## 2025-07-01 ASSESSMENT — COLUMBIA-SUICIDE SEVERITY RATING SCALE - C-SSRS
6. HAVE YOU EVER DONE ANYTHING, STARTED TO DO ANYTHING, OR PREPARED TO DO ANYTHING TO END YOUR LIFE?: NO
1. IN THE PAST MONTH, HAVE YOU WISHED YOU WERE DEAD OR WISHED YOU COULD GO TO SLEEP AND NOT WAKE UP?: NO
2. HAVE YOU ACTUALLY HAD ANY THOUGHTS OF KILLING YOURSELF IN THE PAST MONTH?: NO

## 2025-07-01 NOTE — ED TRIAGE NOTES
Patient is here for evaluation of vomiting several times today. She denies abdominal pain. She notes that she feels OK between episodes of vomiting. She also notes feeling very fatigued.     Triage Assessment (Adult)       Row Name 07/01/25 3531          Triage Assessment    Airway WDL WDL        Respiratory WDL    Respiratory WDL WDL        Skin Circulation/Temperature WDL    Skin Circulation/Temperature WDL WDL        Cardiac WDL    Cardiac WDL WDL        Peripheral/Neurovascular WDL    Peripheral Neurovascular WDL WDL        Cognitive/Neuro/Behavioral WDL    Cognitive/Neuro/Behavioral WDL WDL

## 2025-07-01 NOTE — MEDICATION SCRIBE - ADMISSION MEDICATION HISTORY
Medication Scribe Admission Medication History    Admission medication history is complete. The information provided in this note is only as accurate as the sources available at the time of the update.    Information Source(s): Family member via in-person    Pertinent Information: Patients Daughter Nicolle @[phone] manages medications. Nicolle able to confirm med list, allergies and last dosing. Patient was NOT able to keep down medications this morning due to emesis    Changes made to PTA medication list:  Added: Allegra, APAP, Preservison  Deleted: Fish oil, multivitamin,   Changed: None    Allergies reviewed with patient and updates made in EHR: yes    Medication History Completed By: Juan Miguel Camilo 7/1/2025 5:10 PM    PTA Med List   Medication Sig Last Dose/Taking    acetaminophen (TYLENOL) 500 MG tablet Take 1,000 mg by mouth 3 times daily. 6/30/2025 Evening    fexofenadine (ALLEGRA) 180 MG tablet Take 180 mg by mouth every morning. 6/30/2025 Morning    hydrOXYzine (VISTARIL) 25 MG capsule Take 25 mg by mouth nightly as needed Taking As Needed    levothyroxine (SYNTHROID/LEVOTHROID) 75 MCG tablet Take 75 mcg by mouth daily before breakfast 6/30/2025 Morning    Multiple Vitamins-Minerals (PRESERVISION AREDS PO) Take 2 Capfuls by mouth every morning. 6/30/2025 Morning    pantoprazole (PROTONIX) 40 MG EC tablet Take 40 mg by mouth daily before breakfast 6/30/2025 Morning    vitamin C (ASCORBIC ACID) 500 MG tablet Take 500 mg by mouth daily 6/30/2025 Noon

## 2025-07-01 NOTE — ED PROVIDER NOTES
"EMERGENCY DEPARTMENT ENCOUNTER      NAME: Smita Pastor  AGE: 89 year old female  YOB: 1924  MRN: [id number]  EVALUATION DATE & TIME: No admission date for patient encounter.    PCP: No Ref-Primary, Physician    ED PROVIDER: Skyla Rogers M.D.      Chief Complaint   Patient presents with    Vomiting         FINAL IMPRESSION:  1. NSTEMI (non-ST elevated myocardial infarction) (H)    2. Urinary tract infection without hematuria, site unspecified          ED COURSE & MEDICAL DECISION MAKING:    ED Course as of 07/01/25 1820   Tue Jul 01, 2025   1452 Patient with painless vomiting x3 today with h/o SBO and hernia repair in [place] in 2022, with no chest pain or SOB but one week left scapular region pain with normal BP and equal pulses, pending CTA CAP and EKG, LFTs, lipase, chemistry, UA and reassessment. Zofran administered and ismaeln and her daughter (at bedside) are ok with plan. At age 89 with nausea/vomiting, pending EKG and troponin testing    1652 I spoke with lab who reports results with troponin 773. Aspirin ordered, patient in imaging now and cardiology paged to discuss   1709 UA with WBC and RBC and LE thus likely early UTI, given levaquin antibiotic therapy with h/o PCN related antibiotics with \"swelling\". LFTs WNL with normal lipase, BMP and CBC WNL   1713 I spoke with Dr Vela from cardiology who recommends heparin, will consider angiogram in AM and recommends admission.    1724 Patient and daughter updated. Patient initially not interested in cardiac catheterization, but daughter does want her to do catheterization, they will discuss amongst themselves and discuss also in AM with cardiologist.    1741 Pt endorsed to [facility] resident team to cardiac tele and will f/u CT read   1806 I called [place] [facility] re: read, they note it is marked as read but not read, will have a radiologist read the images now.    1817 I spoke with radiologist Dr Charlton re: CT read who notes he doesn't think " dissection is present, likely turbulent flow in descending aorta with ill defined descending aorta incongruity in appearance with contrast, no other acute pathology apparent       Pertinent Labs & Imaging studies reviewed. (See chart for details)    Medical Decision Making  I obtained history from Family Member/Significant Other  Admit.    MIPS (CTPE, Dental pain, [place], Sinusitis, Asthma/COPD, Head Trauma): CT Pulmonary Angiogram:CT PA was ordered for reason(s) other than PE.    SEPSIS: None    Critical Care time was 45 minutes for this patient excluding procedures.      At the conclusion of the encounter I discussed the results of all of the tests and the disposition. The questions were answered. The patient or family acknowledged understanding and was agreeable with the care plan.     MEDICATIONS GIVEN IN THE EMERGENCY:  Medications   levofloxacin (LEVAQUIN) infusion 750 mg (750 mg Intravenous $New Bag 7/1/25 1739)   heparin 25,000 units in 0.45% NaCl 250 mL ANTICOAGULANT infusion (650 Units/hr Intravenous $New Bag 7/1/25 1738)   sodium chloride 0.9% BOLUS 500 mL (0 mLs Intravenous Stopped 7/1/25 1618)   ondansetron (ZOFRAN) injection 4 mg (4 mg Intravenous $Given 7/1/25 1528)   aspirin (ASA) tablet 325 mg (325 mg Oral $Given 7/1/25 1713)   iopamidol (ISOVUE-370) solution 75 mL (75 mLs Intravenous $Given 7/1/25 1656)   heparin loading dose for LOW INTENSITY TREATMENT * Give BEFORE starting heparin infusion (3,150 Units Intravenous $Given 7/1/25 1736)       NEW PRESCRIPTIONS STARTED AT TODAY'S ER VISIT  New Prescriptions    No medications on file          =================================================================    HPI      Smita Pastor is a 89 year old female with PMHx of GERD, small bowel obstructions, CKD who presents to the ED today via personal car with vomiting.     The patient reports that today she has vomited around 3 times and she generally tiered. She states that in between the episodes she  feels fine, and currently feels okay. Denies abdominal pain, shortness of breath, chest pain, diarrhea, urinary symptoms, recent falls, or any other concerns at this time.    Per the patient's daughter, the patient had an abdominal surgery in 2022 for small bowel obstructions.     Per chart review, the patient called nurse triage for 3 episodes of vomiting with no abdominal pain on 7/1/25.     REVIEW OF SYSTEMS   All other systems reviewed and are negative except as noted above in HPI.    PAST MEDICAL HISTORY:  Past Medical History:   Diagnosis Date    Dyslipidemia     GERD (gastroesophageal reflux disease)     Hiatal hernia     Hypothyroidism 09/05/2022    Seasonal allergies     Wears hearing aid 06/14/2016       PAST SURGICAL HISTORY:  Past Surgical History:   Procedure Laterality Date    CHOLECYSTECTOMY      HYSTERECTOMY         CURRENT MEDICATIONS:    acetaminophen (TYLENOL) 500 MG tablet  fexofenadine (ALLEGRA) 180 MG tablet  hydrOXYzine (VISTARIL) 25 MG capsule  levothyroxine (SYNTHROID/LEVOTHROID) 75 MCG tablet  Multiple Vitamins-Minerals (PRESERVISION AREDS PO)  pantoprazole (PROTONIX) 40 MG EC tablet  vitamin C (ASCORBIC ACID) 500 MG tablet        ALLERGIES:  Allergies   Allergen Reactions    Amoxicillin Swelling    Statins-Hmg-Coa Reductase Inhibitors [Statins] Unknown       FAMILY HISTORY:  Family History   Problem Relation Age of Onset    Tuberculosis Mother     Heart Disease Father        SOCIAL HISTORY:   Social History     Socioeconomic History    Marital status:      Spouse name: None    Number of children: None    Years of education: None    Highest education level: None   Tobacco Use    Smoking status: Never   Substance and Sexual Activity    Alcohol use: Not Currently     Comment: occasional in the past    Drug use: Never     Social Drivers of Health     Financial Resource Strain: Low Risk  (6/14/2023)    Received from [facility]    Financial Resource  "Strain     Difficulty of Paying Living Expenses: 3   Food Insecurity: No Food Insecurity (6/14/2023)    [facility]    Food Insecurity     Worried About Running Out of Food in the Last Year: 1   Transportation Needs: No Transportation Needs (6/14/2023)    Received from [facility]    Transportation Needs     Lack of Transportation (Medical): 1    Received from [facility]    Social Connections   Housing Stability: Low Risk  (6/14/2023)    Received from [facility]    Housing Stability     Unable to Pay for Housing in the Last Year: 1       VITALS:  Patient Vitals for the past 24 hrs:   BP Temp Temp src Pulse Resp SpO2 Height Weight   07/01/25 1715 116/69 -- -- 99 -- (!) 87 % -- --   07/01/25 1700 117/73 -- -- -- -- -- -- --   07/01/25 1630 -- -- -- 99 -- (!) 91 % -- --   07/01/25 1615 115/70 -- -- -- -- -- -- --   07/01/25 1453 (!) 139/91 97.6  F (36.4  C) Oral 99 20 94 % 1.549 m (5' 1\") 52.2 kg (115 lb)       PHYSICAL EXAM    GENERAL: Awake, alert.  In no acute distress.   HEENT: Normocephalic, atraumatic.  Pupils equal, round and reactive.  Conjunctiva normal.  EOMI.  NECK: No stridor or apparent deformity.  PULMONARY: Symmetrical breath sounds without distress.  Lungs clear to auscultation bilaterally without wheezes, rhonchi or rales.  CARDIO: Regular rate and rhythm.  No significant murmur, rub or gallop.  Radial pulses strong and symmetrical.  ABDOMINAL: Abdomen soft, non-distended and non-tender to palpation.  No CVAT, no palpable hepatosplenomegaly.  EXTREMITIES: No lower extremity swelling or edema. Bilateral radial pulse 2+ equally and responsive.   NEURO: Alert and oriented to person, place and time.  Cranial nerves grossly intact.  No focal motor deficit.  PSYCH: Normal mood and affect  SKIN: No rashes      LAB:  All pertinent labs reviewed and " interpreted.  Results for orders placed or performed during the hospital encounter of 07/01/25   Basic metabolic panel   Result Value Ref Range    Sodium 143 135 - 145 mmol/L    Potassium 4.1 3.4 - 5.3 mmol/L    Chloride 104 98 - 107 mmol/L    Carbon Dioxide (CO2) 26 22 - 29 mmol/L    Anion Gap 13 7 - 15 mmol/L    Urea Nitrogen 18.3 8.0 - 23.0 mg/dL    Creatinine 0.85 0.51 - 0.95 mg/dL    GFR Estimate 60 (L) >60 mL/min/1.73m2    Calcium 9.1 8.8 - 10.4 mg/dL    Glucose 126 (H) 70 - 99 mg/dL   Hepatic function panel   Result Value Ref Range    Protein Total 6.9 6.4 - 8.3 g/dL    Albumin 4.1 3.5 - 5.2 g/dL    Bilirubin Total 0.5 <=1.2 mg/dL    Alkaline Phosphatase 85 40 - 150 U/L    AST 28 0 - 45 U/L    ALT 9 0 - 50 U/L    Bilirubin Direct <0.08 0.00 - 0.30 mg/dL   Result Value Ref Range    Lipase 26 13 - 60 U/L   Result Value Ref Range    Troponin T, High Sensitivity 773 (HH) <=14 ng/L   UA with Microscopic reflex to Culture    Specimen: Urine, Clean Catch   Result Value Ref Range    Color Urine Yellow Colorless, Straw, Light Yellow, Yellow    Appearance Urine Clear Clear    Glucose Urine Negative Negative mg/dL    Bilirubin Urine Negative Negative    Ketones Urine 20 (A) Negative mg/dL    Specific Gravity Urine 1.026 1.001 - 1.030    Blood Urine Negative Negative    pH Urine 5.5 5.0 - 7.0    Protein Albumin Urine 50 (A) Negative mg/dL    Urobilinogen Urine Normal Normal mg/dL    Nitrite Urine Negative Negative    Leukocyte Esterase Urine 500 Vir[place]/uL (A) Negative    Bacteria Urine Few (A) None Seen /HPF    Mucus Urine Present (A) None Seen /LPF    RBC Urine 4 (H) <=2 /HPF    WBC Urine 22 (H) <=5 /HPF    Squamous Epithelials Urine 3 (H) <=1 /HPF    Transitional Epithelials Urine 1 <=1 /HPF    Hyaline Casts Urine 5 (H) <=2 /LPF   CBC with platelets and differential   Result Value Ref Range    WBC Count 10.5 4.0 - 11.0 10e3/uL    RBC Count 4.37 3.80 - 5.20 10e6/uL    Hemoglobin 13.5 11.7 - 15.7 g/dL    Hematocrit 42.3  35.0 - 47.0 %    MCV 97 78 - 100 fL    MCH 30.9 26.5 - 33.0 pg    MCHC 31.9 31.5 - 36.5 g/dL    RDW 13.3 10.0 - 15.0 %    Platelet Count 185 150 - 450 10e3/uL    % Neutrophils 79 %    % Lymphocytes 13 %    % Monocytes 7 %    % Eosinophils 0 %    % Basophils 0 %    % Immature Granulocytes 0 %    NRBCs per 100 WBC 0 <1 /100    Absolute Neutrophils 8.3 1.6 - 8.3 10e3/uL    Absolute Lymphocytes 1.3 0.8 - 5.3 10e3/uL    Absolute Monocytes 0.7 0.0 - 1.3 10e3/uL    Absolute Eosinophils 0.0 0.0 - 0.7 10e3/uL    Absolute Basophils 0.0 0.0 - 0.2 10e3/uL    Absolute Immature Granulocytes 0.0 <=0.4 10e3/uL    Absolute NRBCs 0.0 10e3/uL       RADIOLOGY:  Reviewed all pertinent imaging. Please see official radiology report.  CTA Chest Abdomen Pelvis w Contrast    (Results Pending)         EKG:    Reviewed and interpreted as: 1502 normal sinus rhythm with occasional VPC, , no ST anomalies, morphologically similar to prior EKG from 9/4/2022      I have independently reviewed and interpreted the EKG(s) documented above.        I, David Moncada, am serving as a scribe to document services personally performed by Dr. Skyla Rogers based on my observation and the provider's statements to me. I, Skyla Rogers MD attest that David Moncada is acting in a scribe capacity, has observed my performance of the services and has documented them in accordance with my direction.       Skyla Rogers MD  07/01/25 5829

## 2025-07-01 NOTE — H&P
[facility]    History and Physical - Hospitalist Service       Date of Admission:  7/1/2025    Assessment & Plan      Smita Pastor is a 89 year old female admitted on 7/1/2025 She has a past medical history of dyslipidemia GERD, SBO, hernia repair in 2022, and CKD. Patient is admitted for an NSTEMI.     NSTEMI   L scapular pain   EKG unremarkable for ST elevation. Troponin elevated at 773 with repeat at 707. Patient largely asymptomatic. Denies chest pain. Patient does endorse left scapular region pain, concerning for aortic dissection with elevated trops. However, CTA 7/1/25 shows no evidence of dissection or acute aortic pathology. Suspected NSTEMI. ED spoke with cardiology - considering cardiac cath in the morning. Patient started on heparin drip. Patient not interested in cath, however daughter is at bedside and is persistent on patient getting cath. Patient and daughter will continue to discuss amongst themselves and also discuss with cardiologist in the morning.   - NPO at midnight   - Continue heparin drip   - Cardiology consult placed   - Tele monitoring     Emesis   3x episodes 7/1 AM. Resolved upon admissions.     UTI  UA with WBC, RBCs, and leukocyte esterase. However, patient asymptomatic. Denies dysuria. Patient received one dose of levofloxacin in the ED.   - Can consider additional dose of levofloxacin if patient develops urinary symptoms.     GERD   Hiatal hernia   - continue home protonix     Hypothyroidism  - continue home levothyroxine         Diet: NPO for Procedure/Surgery per Anesthesia Guidelines Except for: Meds; Clear liquids before procedure/surgery: ADULT (Age GREATER than or Equal to 18 years) - Clear liquids 2 hours before procedure/surgery  DVT Prophylaxis: Heparin drip  [place] Catheter: Not present  Fluids: mIVF   Lines: peripheral   Cardiac Monitoring: ACTIVE order. Indication: AMI (NSTEMI/ STEMI) (48 hours)  Code Status: No CPR- Do NOT  Intubate    Clinically Significant Risk Factors Present on Admission                                        Disposition Plan      Expected Discharge Date: 07/03/2025                The patient's care was discussed with the Attending Physician, Dr. Avila.      Katherine Johnston MD  Hospitalist Service  [facility]  Securely message with [employer] (more info)  Text page via [facility] Paging/Directory   ______________________________________________________________________    Chief Complaint   Vomiting     History is obtained from the patient and the patient's daughter, Rosalva.     History of Present Illness   Smita Pastor is a 89 year old female who presents with vomiting x3 this morning 7/1/25, which has resolved since arriving to the ED. Reports vomit was yellow, no blood present. Patient endorses one week of intermittent achy pain of the left scapular region. Reports the pain is localized. Patient reports pain is tolerable at this time. Patient has a history of SBO and hernia repair in [place] in 2022. Last bowel movement was this morning. Patient denies chest pain, abdominal pain, and SOB. Patient denies dysuria. Denies fever, chills.     ED course:   - EKG unremarkable  - Troponin elevated at 773. Repeat 2-hr troponin at 707. Given ASA and started on heparin drip  - CTA not convincing for dissection or acute aortic pathology. Demonstrates mild interstitial pulmonary edema and moderate hiatal hernia.   - UA positive for WBC, RBC, and leukocyte esterase. Given one dose of Levaquin.       Past Medical History    Past Medical History:   Diagnosis Date    Dyslipidemia     GERD (gastroesophageal reflux disease)     Hiatal hernia     Hypothyroidism 09/05/2022    Seasonal allergies     Wears hearing aid 06/14/2016       Past Surgical History   Past Surgical History:   Procedure Laterality Date    CHOLECYSTECTOMY      HYSTERECTOMY         Prior to Admission Medications   Prior to Admission Medications    Prescriptions Last Dose Informant Patient Reported? Taking?   Multiple Vitamins-Minerals (PRESERVISION AREDS PO) 6/30/2025 Morning  Yes Yes   Sig: Take 2 Capfuls by mouth every morning.   acetaminophen (TYLENOL) 500 MG tablet 6/30/2025 Evening  Yes Yes   Sig: Take 1,000 mg by mouth 3 times daily.   fexofenadine (ALLEGRA) 180 MG tablet 6/30/2025 Morning  Yes Yes   Sig: Take 180 mg by mouth every morning.   hydrOXYzine (VISTARIL) 25 MG capsule   Yes Yes   Sig: Take 25 mg by mouth nightly as needed   levothyroxine (SYNTHROID/LEVOTHROID) 75 MCG tablet 6/30/2025 Morning  Yes Yes   Sig: Take 75 mcg by mouth daily before breakfast   pantoprazole (PROTONIX) 40 MG EC tablet 6/30/2025 Morning  Yes Yes   Sig: Take 40 mg by mouth daily before breakfast   vitamin C (ASCORBIC ACID) 500 MG tablet 6/30/2025 Noon  Yes Yes   Sig: Take 500 mg by mouth daily      Facility-Administered Medications: None          Physical Exam   Vital Signs: Temp: 97.9  F (36.6  C) Temp src: Oral BP: 118/67 Pulse: 104   Resp: 20 SpO2: 100 % O2 Device: Nasal cannula    Weight: 115 lbs 0 oz    Constitutional: awake, alert, cooperative, no apparent distress, and appears stated age  Respiratory: No increased work of breathing, good air exchange, clear to auscultation bilaterally, no crackles or wheezing. On 4L of O2 NC.   Cardiovascular: Normal apical impulse, regular rate and rhythm, normal S1 and S2, no S3 or S4, and no murmur noted  GI: No scars, normal bowel sounds, soft, non-distended, non-tender, no masses palpated, no hepatosplenomegally  Musculoskeletal: There is no redness, warmth, or swelling of the joints.  Left scapular area non-tender to palpation.   Neurologic: Awake, alert, oriented to name, place and time.    Neuropsychiatric: General: normal, calm, and normal eye contact    Medical Decision Making       Please see A&P for additional details of medical decision making.      Data     I have personally reviewed the following data over the  past 24 hrs:    10.5  \   13.5   / 185     143 104 18.3 /  126 (H)   4.1 26 0.85 \     ALT: 9 AST: 28 AP: 85 TBILI: 0.5   ALB: 4.1 TOT PROTEIN: 6.9 LIPASE: 26     Trop: 707 (HH) BNP: N/A       Imaging results reviewed over the past 24 hrs:   Recent Results (from the past 24 hours)   CTA Chest Abdomen Pelvis w Contrast    Narrative    EXAM: CTA CHEST ABDOMEN PELVIS W CONTRAST  LOCATION: [facility]  DATE: 7/1/2025    INDICATION: pain to left shoulder blade for one week, vomited x3 today, h o hernia repair in [place] 2022  COMPARISON: CT abdomen/pelvis 9/5/2022  TECHNIQUE: CT angiogram chest abdomen pelvis during arterial phase of injection of IV contrast. 2D and 3D MIP reconstructions were performed by the CT technologist. Dose reduction techniques were used.   CONTRAST: 75mL ISOVUE 370    FINDINGS:   CT ANGIOGRAM CHEST, ABDOMEN, AND PELVIS:   Pulmonary Arteries: The pulmonary arteries are well-opacified with contrast. No filling defects are seen to suggest thromboembolism.    Thoracic Aorta: Patent and normal in caliber. Mild aortic calcifications. Ill-defined curvilinear hypodensity in the descending thoracic aorta and abdominal aorta, which appears discontinuous and does not demonstrate clear termination, favored to   represent artifact from turbulent flow. No convincing evidence of dissection, intramural hematoma, or penetrating ulcer.    Abdominal Aorta: Patent and normal in caliber. Severe aortic calcifications. Ill-defined curvilinear hypodensity in the descending thoracic aorta and abdominal aorta, which appears discontinuous and does not demonstrate clear termination, favored to   represent artifact from turbulent flow. No convincing evidence of dissection or penetrating ulcer.    Celiac Artery: Patent.  Superior Mesenteric Artery: Patent.  Right Renal Artery: Patent.  Left Renal Artery: Patent.  Inferior Mesenteric Artery: Patent.    Right Common Iliac Artery: Patent.  Right  External Iliac Artery: Patent.  Right Internal Iliac Artery: Patent.  Right Common Femoral Artery: Patent.  Proximal Right Superficial Femoral Artery: Patent.  Proximal Right Deep Femoral Artery: Patent.    Left Common Iliac Artery: Patent.  Left External Iliac Artery: Patent.  Left Internal Iliac Artery: Patent.  Left Common Femoral Artery: Patent.  Proximal Left Superficial Femoral Artery: Patent.  Proximal Left Deep Femoral Artery: Patent.    LUNGS AND PLEURA: Mild interlobular septal thickening, most pronounced at the lung bases and apices. No focal airspace disease. No pleural effusion or pneumothorax. Peripheral linear opacities, most pronounced at the lung bases, favored to represent   scarring.    MEDIASTINUM/AXILLAE: No lymphadenopathy. Mild cardiomegaly. Trace pericardial effusion.    CORONARY ARTERY CALCIFICATION: Severe.    HEPATOBILIARY: Liver appears unremarkable. Status post cholecystectomy. Mild common duct dilatation and central intrahepatic biliary dilatation, likely postcholecystectomy reservoir effect.    PANCREAS: Normal.    SPLEEN: Normal.    ADRENAL GLANDS: Normal.    KIDNEYS/BLADDER: Normal.    BOWEL: Moderate hiatal hernia. Extensive sigmoid diverticulosis. No evidence of acute diverticulitis. Small bowel anastomosis in the lower abdomen. Small bowel otherwise appears normal. No bowel obstruction. Appendix not visualized, although no secondary   signs of acute appendicitis.    LYMPH NODES: No lymphadenopathy.    PELVIC ORGANS: Status post hysterectomy.    MUSCULOSKELETAL: Chronic bilateral rib fractures. Multilevel degenerative changes of the spine. Chronic T12 compression fracture.      Impression    IMPRESSION:  1.  Ill-defined curvilinear hypodensity in the descending thoracic aorta and abdominal aorta, which appears discontinuous, most likely artifact from turbulent flow. No convincing evidence of dissection or acute aortic pathology.    2.  Mild interstitial pulmonary edema.    3.   Moderate hiatal hernia.

## 2025-07-02 ENCOUNTER — APPOINTMENT (OUTPATIENT)
Dept: CARDIOLOGY | Facility: HOSPITAL | Age: OVER 89
DRG: 316 | End: 2025-07-02
Attending: INTERNAL MEDICINE
Payer: MEDICARE

## 2025-07-02 PROBLEM — E78.5 DYSLIPIDEMIA: Status: ACTIVE | Noted: 2022-09-05

## 2025-07-02 LAB
ANION GAP SERPL CALCULATED.3IONS-SCNC: 10 MMOL/L (ref 7–15)
BACTERIA UR CULT: ABNORMAL
BUN SERPL-MCNC: 17.6 MG/DL (ref 8–23)
CALCIUM SERPL-MCNC: 8.3 MG/DL (ref 8.8–10.4)
CHLORIDE SERPL-SCNC: 109 MMOL/L (ref 98–107)
CREAT SERPL-MCNC: 0.79 MG/DL (ref 0.51–0.95)
EGFRCR SERPLBLD CKD-EPI 2021: 66 ML/MIN/1.73M2
GLUCOSE SERPL-MCNC: 84 MG/DL (ref 70–99)
HCO3 SERPL-SCNC: 24 MMOL/L (ref 22–29)
HOLD SPECIMEN: NORMAL
LVEF ECHO: NORMAL
POTASSIUM SERPL-SCNC: 3.7 MMOL/L (ref 3.4–5.3)
SODIUM SERPL-SCNC: 143 MMOL/L (ref 135–145)
UFH PPP CHRO-ACNC: 0.32 IU/ML (ref ?–1.1)
UFH PPP CHRO-ACNC: 0.36 IU/ML (ref ?–1.1)

## 2025-07-02 PROCEDURE — 99223 1ST HOSP IP/OBS HIGH 75: CPT | Mod: AI

## 2025-07-02 PROCEDURE — 93306 TTE W/DOPPLER COMPLETE: CPT | Mod: 26 | Performed by: INTERNAL MEDICINE

## 2025-07-02 PROCEDURE — 80048 BASIC METABOLIC PNL TOTAL CA: CPT

## 2025-07-02 PROCEDURE — 250N000013 HC RX MED GY IP 250 OP 250 PS 637: Performed by: INTERNAL MEDICINE

## 2025-07-02 PROCEDURE — 258N000003 HC RX IP 258 OP 636

## 2025-07-02 PROCEDURE — 120N000004 HC R&B MS OVERFLOW

## 2025-07-02 PROCEDURE — 36415 COLL VENOUS BLD VENIPUNCTURE: CPT | Performed by: FAMILY MEDICINE

## 2025-07-02 PROCEDURE — 255N000002 HC RX 255 OP 636: Performed by: INTERNAL MEDICINE

## 2025-07-02 PROCEDURE — 99222 1ST HOSP IP/OBS MODERATE 55: CPT | Mod: GC | Performed by: INTERNAL MEDICINE

## 2025-07-02 PROCEDURE — C8929 TTE W OR WO FOL WCON,DOPPLER: HCPCS

## 2025-07-02 PROCEDURE — 85520 HEPARIN ASSAY: CPT | Performed by: FAMILY MEDICINE

## 2025-07-02 PROCEDURE — 36415 COLL VENOUS BLD VENIPUNCTURE: CPT

## 2025-07-02 PROCEDURE — 250N000013 HC RX MED GY IP 250 OP 250 PS 637

## 2025-07-02 RX ORDER — LISINOPRIL 2.5 MG/1
2.5 TABLET ORAL DAILY
Status: DISCONTINUED | OUTPATIENT
Start: 2025-07-02 | End: 2025-07-03

## 2025-07-02 RX ADMIN — ACETAMINOPHEN 650 MG: 325 TABLET, FILM COATED ORAL at 23:11

## 2025-07-02 RX ADMIN — LEVOTHYROXINE SODIUM 75 MCG: 0.03 TABLET ORAL at 06:38

## 2025-07-02 RX ADMIN — ACETAMINOPHEN 650 MG: 325 TABLET, FILM COATED ORAL at 18:56

## 2025-07-02 RX ADMIN — FEXOFENADINE HCL 180 MG: 180 TABLET ORAL at 08:04

## 2025-07-02 RX ADMIN — PANTOPRAZOLE SODIUM 40 MG: 40 TABLET, DELAYED RELEASE ORAL at 06:38

## 2025-07-02 RX ADMIN — SODIUM CHLORIDE: 0.9 INJECTION, SOLUTION INTRAVENOUS at 09:25

## 2025-07-02 RX ADMIN — LISINOPRIL 2.5 MG: 2.5 TABLET ORAL at 18:02

## 2025-07-02 RX ADMIN — PERFLUTREN 3 ML: 6.52 INJECTION, SUSPENSION INTRAVENOUS at 13:00

## 2025-07-02 ASSESSMENT — ACTIVITIES OF DAILY LIVING (ADL)
ADLS_ACUITY_SCORE: 44
ADLS_ACUITY_SCORE: 45
ADLS_ACUITY_SCORE: 44
ADLS_ACUITY_SCORE: 45
ADLS_ACUITY_SCORE: 44
ADLS_ACUITY_SCORE: 45
ADLS_ACUITY_SCORE: 44
ADLS_ACUITY_SCORE: 45
ADLS_ACUITY_SCORE: 44
ADLS_ACUITY_SCORE: 44
ADLS_ACUITY_SCORE: 45
ADLS_ACUITY_SCORE: 45
ADLS_ACUITY_SCORE: 44

## 2025-07-02 NOTE — PROGRESS NOTES
[facility]    Medicine Progress Note - Hospitalist Service    Date of Admission:  7/1/2025    Assessment & Plan      Smita Pastor is a 89 year old female admitted on 7/1/2025. She has a past medical history of dyslipidemia GERD, SBO, hernia repair in 2022, and CKD. Patient is admitted for an NSTEMI.     NSTEMI  Emesis - resolved   Patient presented with 3 episodes of emesis at home, has since resolved. EKG 7/1 unremarkable for ST elevation. Troponin elevated at 773 with repeat at 707. Patient largely asymptomatic. Denies chest pain and shortness of breath. Patient does endorse one week of intermittent achy left scapular region pain, concerning for aortic dissection with elevated trops. However, CTA 7/1/25 showed no evidence of dissection or acute aortic pathology. Given her isolated abnormal troponin and reassuring EKG, less likely to be an ischemic injury. Patient seen by cardiology 7/2 AM. Given patient's age, shared decision with patient and daughter to not proceed with excessive procedures at this time, will get an echo to evaluate heart function and re-discuss possibility of cardiac cath pending echo results.      - Tele monitoring    - Cardiology consulted. Appreciate recs.    - ECHO to evaluate heart function.    - if ECHO normal, will follow cards outpatient    - if ECHO abnormal, will again address possibility of cardiac cath  - Continue heparin drip for possible cath pending ECHO results    Left Scapular pain   Left scapular pain initially concerning for aortic dissection in the setting of elevated troponin, however CTA showed no evidence of dissection or acute aortic pathology. Pain intermittent for about a week. No swelling, redness, or rash of the left scapular region. Left scapular pain likely due to immobility and laying in bed. Although unlikely due to normal skin appearance of the left scapular region, differential diagnosis should include shingles due to the unilateral  dermatomal pattern. This morning 7/2, patient reports the pain has resolved with tylenol.   - tylenol PRN     Pyuria   UA with WBC, RBCs, and leukocyte esterase. However, patient asymptomatic. Denies dysuria and urinary frequency. Patient received one dose of levofloxacin in the ED 7/1. Can consider additional antibiotics if patient develops urinary symptoms.   - Urine culture pending     GERD   Hiatal hernia   - continue home protonix     Hypothyroidism  - continue home levothyroxine          Diet: Low Saturated Fat Na <2400 mg    DVT Prophylaxis: Heparin gtt  [place] Catheter: Not present  Lines: Left Peripheral IV  Cardiac Monitoring: ACTIVE order. Indication: AMI (NSTEMI/ STEMI) (48 hours)  Code Status: No CPR- Do NOT Intubate      Clinically Significant Risk Factors Present on Admission          # Hyperchloremia: Highest Cl = 109 mmol/L in last 2 days, will monitor as appropriate      # Hypocalcemia: Lowest Ca = 8.3 mg/dL in last 2 days, will monitor and replace as appropriate                              Social Drivers of Health    Depression: At risk (9/11/2024)    Received from [employer]    PHQ-2     PHQ-2 TOTAL SCORE: 4   Housing Stability: High Risk (7/1/2025)    Housing Stability     Do you have housing? : No     Are you worried about losing your housing?: No   Tobacco Use: Unknown (7/1/2025)    Patient History     Smoking Tobacco Use: Never     Smokeless Tobacco Use: Unknown   Interpersonal Safety: High Risk (7/1/2025)    Interpersonal Safety     Do you feel physically and emotionally safe where you currently live?: No     Within the past 12 months, have you been hit, slapped, kicked or otherwise physically hurt by someone?: No     Within the past 12 months, have you been humiliated or emotionally abused in other ways by your partner or ex-partner?: No    Received from [employer]    Social Connections          The patient's care was  discussed with the Attending Physician, Dr. Julian.    Katherine Johnston MD  Hospitalist Service  [facility]  Securely message with Go Voc[employer] (more info)  Text page via [facility] Paging/Directory   ______________________________________________________________________    Interval History   No acute events overnight. Daughter, Nicolle, at bedsides this Am. Patient denies chest pain and shortness of breath. Denies dysuria and urinary frequency. Vital signs stable. Currently on 4L of oxygen with appropriate O2 saturations.     Physical Exam   Vital Signs: Temp: 98.8  F (37.1  C) Temp src: Oral BP: 122/65 Pulse: 93   Resp: 20 SpO2: 100 % O2 Device: Nasal cannula Oxygen Delivery: 4 LPM  Weight: 115 lbs 0 oz     Constitutional: awake, alert, cooperative, no apparent distress, and appears stated age  Respiratory: No increased work of breathing, good air exchange, clear to auscultation bilaterally, no crackles or wheezing. On 4L of O2 NC.   Cardiovascular: Normal apical impulse, regular rate and rhythm, normal S1 and S2, no S3 or S4, and no murmur noted  GI: No scars, normal bowel sounds, soft, non-distended, non-tender, no masses palpated, no hepatosplenomegally  Skin: normal skin color, texture, turgor, no redness, warmth, or swelling, and no rashes of left scapular region.   Musculoskeletal: There is no redness, warmth, or swelling of the joints.  Left scapular region non-tender to palpation.   Neurologic: Awake, alert, oriented to name, place and time.  Cranial nerves II-XII are grossly intact.  Motor is 5 out of 5 bilaterally.  Cerebellar finger to nose, heel to shin intact.  Sensory is intact.  Babinski down going, Romberg negative, and gait is normal.  Neuropsychiatric: General: normal, calm, and normal eye contact      Data     I have personally reviewed the following data over the past 24 hrs:    10.5  \   13.5   / 185     143 109 (H) 17.6 /  84   3.7 24 0.79 \     ALT: 9 AST: 28 AP: 85 TBILI: 0.5   ALB:  4.1 TOT PROTEIN: 6.9 LIPASE: 26     Trop: 707 (HH) BNP: N/A     TSH: N/A T4: N/A A1C: 5.2       Imaging results reviewed over the past 24 hrs:   Recent Results (from the past 24 hours)   CTA Chest Abdomen Pelvis w Contrast    Narrative    EXAM: CTA CHEST ABDOMEN PELVIS W CONTRAST  LOCATION: [facility]  DATE: 7/1/2025    INDICATION: pain to left shoulder blade for one week, vomited x3 today, h o hernia repair in [place] 2022  COMPARISON: CT abdomen/pelvis 9/5/2022  TECHNIQUE: CT angiogram chest abdomen pelvis during arterial phase of injection of IV contrast. 2D and 3D MIP reconstructions were performed by the CT technologist. Dose reduction techniques were used.   CONTRAST: 75mL ISOVUE 370    FINDINGS:   CT ANGIOGRAM CHEST, ABDOMEN, AND PELVIS:   Pulmonary Arteries: The pulmonary arteries are well-opacified with contrast. No filling defects are seen to suggest thromboembolism.    Thoracic Aorta: Patent and normal in caliber. Mild aortic calcifications. Ill-defined curvilinear hypodensity in the descending thoracic aorta and abdominal aorta, which appears discontinuous and does not demonstrate clear termination, favored to   represent artifact from turbulent flow. No convincing evidence of dissection, intramural hematoma, or penetrating ulcer.    Abdominal Aorta: Patent and normal in caliber. Severe aortic calcifications. Ill-defined curvilinear hypodensity in the descending thoracic aorta and abdominal aorta, which appears discontinuous and does not demonstrate clear termination, favored to   represent artifact from turbulent flow. No convincing evidence of dissection or penetrating ulcer.    Celiac Artery: Patent.  Superior Mesenteric Artery: Patent.  Right Renal Artery: Patent.  Left Renal Artery: Patent.  Inferior Mesenteric Artery: Patent.    Right Common Iliac Artery: Patent.  Right External Iliac Artery: Patent.  Right Internal Iliac Artery: Patent.  Right Common Femoral Artery:  Patent.  Proximal Right Superficial Femoral Artery: Patent.  Proximal Right Deep Femoral Artery: Patent.    Left Common Iliac Artery: Patent.  Left External Iliac Artery: Patent.  Left Internal Iliac Artery: Patent.  Left Common Femoral Artery: Patent.  Proximal Left Superficial Femoral Artery: Patent.  Proximal Left Deep Femoral Artery: Patent.    LUNGS AND PLEURA: Mild interlobular septal thickening, most pronounced at the lung bases and apices. No focal airspace disease. No pleural effusion or pneumothorax. Peripheral linear opacities, most pronounced at the lung bases, favored to represent   scarring.    MEDIASTINUM/AXILLAE: No lymphadenopathy. Mild cardiomegaly. Trace pericardial effusion.    CORONARY ARTERY CALCIFICATION: Severe.    HEPATOBILIARY: Liver appears unremarkable. Status post cholecystectomy. Mild common duct dilatation and central intrahepatic biliary dilatation, likely postcholecystectomy reservoir effect.    PANCREAS: Normal.    SPLEEN: Normal.    ADRENAL GLANDS: Normal.    KIDNEYS/BLADDER: Normal.    BOWEL: Moderate hiatal hernia. Extensive sigmoid diverticulosis. No evidence of acute diverticulitis. Small bowel anastomosis in the lower abdomen. Small bowel otherwise appears normal. No bowel obstruction. Appendix not visualized, although no secondary   signs of acute appendicitis.    LYMPH NODES: No lymphadenopathy.    PELVIC ORGANS: Status post hysterectomy.    MUSCULOSKELETAL: Chronic bilateral rib fractures. Multilevel degenerative changes of the spine. Chronic T12 compression fracture.      Impression    IMPRESSION:  1.  Ill-defined curvilinear hypodensity in the descending thoracic aorta and abdominal aorta, which appears discontinuous, most likely artifact from turbulent flow. No convincing evidence of dissection or acute aortic pathology.    2.  Mild interstitial pulmonary edema.    3.  Moderate hiatal hernia.

## 2025-07-02 NOTE — PROGRESS NOTES
Patient report given to TIFFANIE Rios. Patient transported up to floor with Heparin and Levofloxacin gtt infusing. Family transported with patient to P310. Medications and belongings sent with patient and family.

## 2025-07-02 NOTE — PLAN OF CARE
Heart Failure Care Map  GOALS TO BE MET BEFORE DISCHARGE:    1. Decrease congestion and/or edema with diuretic therapy to achieve near optimal volume status.     Dyspnea improved: Yes, satisfactory for discharge.   Edema improved: Yes, satisfactory for discharge.        Last 24 hour I/O:   Intake/Output Summary (Last 24 hours) at 7/2/2025 1334  Last data filed at 7/2/2025 1232  Gross per 24 hour   Intake 2096.25 ml   Output 800 ml   Net 1296.25 ml           Net I/O and Weights since admission:   06/02 1500 - 07/02 1459  In: 2096.25 [P.O.:340; I.V.:1256.25]  Out: 800 [Urine:800]  Net: 1296.25     Vitals:    07/01/25 1453   Weight: 52.2 kg (115 lb)       2.  O2 sats > 90% on room air, or at prior home O2 therapy level.      Able to wean O2 this shift to keep sats above 90%?: No, further care required to meet this goal. Please explain On 4 lpm. Will  initiate waen off.   Does patient use Home O2? No          Current oxygenation status:   SpO2: 100 %     O2 Device: Nasal cannula, Oxygen Delivery: 4 LPM    3.  Tolerates ambulation and mobility near baseline.     Ambulation: Yes, satisfactory for discharge.   Times patient ambulated with staff this shift: 1    Please review the Heart Failure Care Map for additional HF goal outcomes.  Vitally stable. Pain on right groin, Tylenol given. Denied CP. IVF/ Heparin gtt D/Cd. L S diminished. SR with Occ PVC's. Fair appetite. Voids spontaneously. Patient ambulate with assist to bathroom. Daughter at bedside. Monitor.     Gabriel Melara RN  7/2/2025

## 2025-07-02 NOTE — PLAN OF CARE
Goal Outcome Evaluation:       Pt is alert and oriented x 4, denies pain at this time. [place] with bilateral hearing aids in place, pt's glass are with her here at this hospital. Her wallet, and clothing will be taken home by daughter Nicolle. No home medication. RA, NSR with first degree AVB on tele. Skin is clean dry and intact with the exception of a scab on the right shin. Coccyx area red but blanchable. NS started at 75 ml per hour. Will continue to monitor pt.

## 2025-07-02 NOTE — PROGRESS NOTES
Patient denies chest pain, abdominal pain, and SOB . Pt has bee npo since midnight, prepped for a possible angiogram today. Procedure has not been assigned yet. Pt's daughter still at bed side. Heparin continues to run at 650 units. Pending the next anti xa at 0820. Will continue to monitor.

## 2025-07-02 NOTE — PROGRESS NOTES
Patient admitted to room 310 from ED patient arrived on a cart and transfer with assist of two staff. She is ambulatory. VSS. Denied pain, On oxygen 4 lpm/NC.Heparin gtt running at 650 unit/hr and IV Levaquin. SR tachy per tele, Patient oriented to room and safety devices. Patient's daughter at bed side. Reported off to Valentina shift  RN.

## 2025-07-02 NOTE — PROGRESS NOTES
The next anti xa will be at 0820 am. No dose/rate change of heparin at this time based on the Anti Xa result. Will continue to monitor pt.

## 2025-07-02 NOTE — CARE PLAN
CARDIOLOGY FOLLOW UP NOTE    Echocardiogram returns with very reduced EF in a pattern consistent with stress-cardiomyopathy, not ACS/ischemia.    Discussed with patient and daughter, will plan medical Rx for now    Add Lisinopril 2.5mg daily  If BP tolerates can add low dose beta blockers 7/3/2025  Plan recheck echocardiogram in 4-6 weeks and if EF not recovered can revisit the angiogram     Marizol Diaz MD  Noninvasive Cardiologist   [facility]

## 2025-07-02 NOTE — CONSULTS
"[facility] HEART [facility]   [address], [place] [address]   [email or web address]   OFFICE: [phone]     CARDIOLOGY INPATIENT CONSULT NOTE     Impression and Plan     Assessment:  Left scapular pain  Given her isolated abnormal troponin, atypical symptoms of left scapular pain and vomiting, and reassuring ECG, we are not convinced that this is an ischemic injury. Additionally, given her age, we do not believe it is appropriate to proceed with excessive procedures at this time.     Plan:  ECHO to evaluate heart function  If ECHO is normal, will set patient up with an outpatient appointment for a stress test  If ECHO is abnormal, will again address the possibility of an angiogram       History of Present Illness      Ms. Smita Pastor is a 89 year old female with a history of dyslipidemia, CKD, GERD, and hypothyroidism, who presented for evaluation of left scapular pain and vomiting. The patient developed left scapular pain about a week ago that she described as an \"ache.\" She then had 3 episodes of vomiting on 7/1, which prompted her to come into the ED.     Currently, the patient reports that her scapular pain has resolved and she has had no more episodes of vomiting. She is feeling better. She has no cardiac history. She denies any chest pain/pressure/tightness, shortness of breath at rest or with exertion, light headedness/dizziness, pre-syncope, syncope, lower extremity swelling, palpitations, paroxysmal nocturnal dyspnea (PND), or orthopnea.    EKG revealed no ST elevation. Trop 773, repeat trop 707.     Review of Systems:  Further review of systems is otherwise negative/noncontributory (based on review of medical record (admission H&P) and 13 point review of systems reviewed. Pertinent positives noted).    Cardiac Diagnostics     ECG: Personally reviewed and interpreted: Sinus tachycardia with 1st degree AV block with occasional PVCs and fusion complexes    Telemetry " (personally reviewed): sinus rhythm    Most recent:  Echocardiogram (results reviewed): results pending      Medical History  Surgical History Family History Social History   Past Medical History:   Diagnosis Date    Dyslipidemia     GERD (gastroesophageal reflux disease)     Hiatal hernia     Hypothyroidism 09/05/2022    Seasonal allergies     Wears hearing aid 06/14/2016     Past Surgical History:   Procedure Laterality Date    CHOLECYSTECTOMY      HYSTERECTOMY       Family History   Problem Relation Age of Onset    Tuberculosis Mother     Heart Disease Father          Social History     Socioeconomic History    Marital status:      Spouse name: Not on file    Number of children: Not on file    Years of education: Not on file    Highest education level: Not on file   Occupational History    Not on file   Tobacco Use    Smoking status: Never    Smokeless tobacco: Not on file   Substance and Sexual Activity    Alcohol use: Not Currently     Comment: occasional in the past    Drug use: Never    Sexual activity: Not on file   Other Topics Concern    Not on file   Social History Narrative    Not on file     Social Drivers of Health     Financial Resource Strain: Low Risk  (7/1/2025)    Financial Resource Strain     Within the past 12 months, have you or your family members you live with been unable to get utilities (heat, electricity) when it was really needed?: No   Food Insecurity: Low Risk  (7/1/2025)    Food Insecurity     Within the past 12 months, did you worry that your food would run out before you got money to buy more?: No     Within the past 12 months, did the food you bought just not last and you didn t have money to get more?: No   Transportation Needs: Low Risk  (7/1/2025)    Transportation Needs     Within the past 12 months, has lack of transportation kept you from medical appointments, getting your medicines, non-medical meetings or appointments, work, or from getting things that you need?: No  "  Physical Activity: Not on file   Stress: Not on file   Social Connections: Unknown (6/15/2024)    Received from [facility]    Social Connections     Frequency of Communication with Friends and Family: Not on file   Interpersonal Safety: High Risk (7/1/2025)    Interpersonal Safety     Do you feel physically and emotionally safe where you currently live?: No     Within the past 12 months, have you been hit, slapped, kicked or otherwise physically hurt by someone?: No     Within the past 12 months, have you been humiliated or emotionally abused in other ways by your partner or ex-partner?: No   Housing Stability: High Risk (7/1/2025)    Housing Stability     Do you have housing? : No     Are you worried about losing your housing?: No             Physical Examination   VITALS: /68 (BP Location: Left arm)   Pulse 87   Temp 97.7  F (36.5  C) (Oral)   Resp 20   Ht 1.549 m (5' 1\")   Wt 52.2 kg (115 lb)   SpO2 100%   BMI 21.73 kg/m    BMI: Body mass index is 21.73 kg/m .  Wt Readings from Last 3 Encounters:   07/01/25 52.2 kg (115 lb)   09/05/22 50.9 kg (112 lb 3.2 oz)       Intake/Output Summary (Last 24 hours) at 7/2/2025 0928  Last data filed at 7/1/2025 1618  Gross per 24 hour   Intake 500 ml   Output --   Net 500 ml       General: pleasant female. No acute distress. Laying in bed.   Neck: No JVD  Lungs: clear to auscultation  COR: Regular rate and rhythm, No murmurs, rubs, or gallops  Extrem: No edema         Non-cardiac Imaging Studies Reviewed      CTA Chest Abdomen Pelvis 7/1/2025:  1.  Ill-defined curvilinear hypodensity in the descending thoracic aorta and abdominal aorta, which appears discontinuous, most likely artifact from turbulent flow. No convincing evidence of dissection or acute aortic pathology.  2.  Mild interstitial pulmonary edema.  3.  Moderate hiatal hernia.     Lab Results Reviewed    Chemistry/lipid CBC Cardiac Enzymes/BNP/TSH/INR   No results for " "input(s): \"CHOL\", \"HDL\", \"LDL\", \"TRIG\", \"CHOLHDLRATIO\" in the last 68111 hours.  No results for input(s): \"LDL\" in the last 62869 hours.  Recent Labs   Lab Test 07/02/25  0429      POTASSIUM 3.7   CHLORIDE 109*   CO2 24   GLC 84   BUN 17.6   CR 0.79   GFRESTIMATED 66   KATHERYN 8.3*     Recent Labs   Lab Test 07/02/25  0429 07/01/25  1523 09/05/22  0125   CR 0.79 0.85 1.12*     Recent Labs   Lab Test 07/01/25  1523   A1C 5.2          Recent Labs   Lab Test 07/01/25  1523   WBC 10.5   HGB 13.5   HCT 42.3   MCV 97        Recent Labs   Lab Test 07/01/25  1523 09/04/22  2341   HGB 13.5 14.3    Recent Labs   Lab Test 09/04/22  2341   TROPONINI 0.02     No results for input(s): \"BNP\", \"NTBNPI\", \"NTBNP\" in the last 95267 hours.  No results for input(s): \"TSH\" in the last 50423 hours.  No results for input(s): \"INR\" in the last 63475 hours.        Current Inpatient Scheduled Medications   Scheduled Meds:  Current Facility-Administered Medications   Medication Dose Route Frequency Provider Last Rate Last Admin    fexofenadine (ALLEGRA) tablet 180 mg  180 mg Oral Katherine Bianchi MD   180 mg at 07/02/25 0804    levothyroxine (SYNTHROID/LEVOTHROID) tablet 75 mcg  75 mcg Oral RODNEYM Katherine Bhat MD   75 mcg at 07/02/25 0638    pantoprazole (PROTONIX) EC tablet 40 mg  40 mg Oral Katherine Savage MD   40 mg at 07/02/25 0638    sodium chloride (PF) 0.9% PF flush 3 mL  3 mL Intracatheter Q8H [facility] Katherine Johnston MD   3 mL at 07/02/25 0639     Continuous Infusions:  Current Facility-Administered Medications   Medication Dose Route Frequency Provider Last Rate Last Admin    heparin 25,000 units in 0.45% NaCl 250 mL ANTICOAGULANT infusion  0-5,000 Units/hr Intravenous Continuous Vickie, MD Katherine 6.5 mL/hr at 07/02/25 0811 650 Units/hr at 07/02/25 0811    Patient is already receiving anticoagulation with heparin, enoxaparin (LOVENOX), warfarin (COUMADIN)  or other anticoagulant medication   Does not apply Continuous PRN Vickie, " MD Katherine        sodium chloride 0.9 % infusion   Intravenous Continuous Katherine Johnston MD 75 mL/hr at 07/02/25 0925 New Bag at 07/02/25 0925            Medications Prior to Admission   Prior to Admission medications    Medication Sig Start Date End Date Taking? Authorizing Provider   acetaminophen (TYLENOL) 500 MG tablet Take 1,000 mg by mouth 3 times daily.   Yes Reported, Patient   fexofenadine (ALLEGRA) 180 MG tablet Take 180 mg by mouth every morning.   Yes Reported, Patient   hydrOXYzine (VISTARIL) 25 MG capsule Take 25 mg by mouth nightly as needed   Yes Unknown, Entered By History   levothyroxine (SYNTHROID/LEVOTHROID) 75 MCG tablet Take 75 mcg by mouth daily before breakfast   Yes Unknown, Entered By History   Multiple Vitamins-Minerals (PRESERVISION AREDS PO) Take 2 Capfuls by mouth every morning.   Yes Reported, Patient   pantoprazole (PROTONIX) 40 MG EC tablet Take 40 mg by mouth daily before breakfast   Yes Unknown, Entered By History   vitamin C (ASCORBIC ACID) 500 MG tablet Take 500 mg by mouth daily   Yes Unknown, Entered By History          Saba Young MS4    ADDENDUM BY DR WASHINGTON    I have independently seen and examined the patient, and discussed the care with Saba Young - MS4 .  Please see hher note for additional details.    HPI:  Smita Pastor is a 89 year old year old female with a history of hypothyroid, GERD otherwise well who was admitted 7/1/2025 for scapular pain, vomiting.  ECG unremarkable, hs troponin 773, 707, cardiology called to assist.    She reports she had been in usual state of health until about 5 days prior to admit when she noted the onset of constant scapular pain, no chest pain, no dyspnea, orthopnea,or PND.  Over the last day she began vomiting so family brought her for care.  She currently feels well with resolution of symptoms.      Past Cardiac History:  None    Most Recent Echocardiogram: None    Most Recent Stress Test: None    Most Recent Angiogram:  "None    Review of Systems, Past Medical History, Family History, Social History, Meds, Labs, ECG, tele per Saba Young - MS4     Exam:/68 (BP Location: Left arm)   Pulse 87   Temp 97.7  F (36.5  C) (Oral)   Resp 20   Ht 1.549 m (5' 1\")   Wt 52.2 kg (115 lb)   SpO2 100%   BMI 21.73 kg/m     GENERAL: Alert and oriented in no acute distress  HEENT: Pupils equal round react to light and accommodation; extraocular movement intact; no scleral icterus; conjunctiva benign; oral mucosa moist   NECK: supple; no lymphadenopathy or thyromegaly  RESPIRATORY: lungs clear to auscultation and percussion  CARDIOVASCULAR: Carotids: Full and symmetric bilaterally without bruits, No jugular venous distention; Regular rate and rhythm, normal S1, normal S2, no S3 or S4, no murmurs   GASTROINTESTINAL: soft, non-tender, no hepatosplenomegaly or palpable masses, no bruits  PULSES: femoral pulses, dorsalis pulses, posterior tibial pulses symmetrical, normal   EXTREMITIES: No clubbing, cyanosis or edema    Assessment:  89 year old female with elevated troponin     Plan:   Elevated troponin  Unclear etiology, but atypical for ACS/ischemia.  Consider demand ischemia vs ACS.  Discussed with daughter and niece (an RN), I think angiogram is quite aggressive at her age with atypical symptoms and normal ECG.      -Rest echocardiogram, if normal plan medical Rx for presumed underlying CAD.  If EF or wall motion abnormal can discuss pros/cons of medical Rx vs invasive evaluation         Marizol Diaz MD  Noninvasive Cardiologist   [facility]    "

## 2025-07-03 VITALS
DIASTOLIC BLOOD PRESSURE: 62 MMHG | BODY MASS INDEX: 20.48 KG/M2 | WEIGHT: 108.47 LBS | SYSTOLIC BLOOD PRESSURE: 95 MMHG | HEART RATE: 87 BPM | HEIGHT: 61 IN | RESPIRATION RATE: 18 BRPM | TEMPERATURE: 97.9 F | OXYGEN SATURATION: 92 %

## 2025-07-03 DIAGNOSIS — I50.21 ACUTE SYSTOLIC HEART FAILURE (H): Primary | ICD-10-CM

## 2025-07-03 PROCEDURE — 99239 HOSP IP/OBS DSCHRG MGMT >30: CPT | Mod: GC

## 2025-07-03 PROCEDURE — 99232 SBSQ HOSP IP/OBS MODERATE 35: CPT | Mod: FS | Performed by: INTERNAL MEDICINE

## 2025-07-03 PROCEDURE — 250N000013 HC RX MED GY IP 250 OP 250 PS 637: Performed by: INTERNAL MEDICINE

## 2025-07-03 PROCEDURE — 250N000013 HC RX MED GY IP 250 OP 250 PS 637

## 2025-07-03 PROCEDURE — 99207 PR APP CREDIT; MD BILLING SHARED VISIT: CPT | Mod: FS

## 2025-07-03 RX ADMIN — LEVOTHYROXINE SODIUM 75 MCG: 0.03 TABLET ORAL at 06:34

## 2025-07-03 RX ADMIN — PANTOPRAZOLE SODIUM 40 MG: 40 TABLET, DELAYED RELEASE ORAL at 06:34

## 2025-07-03 RX ADMIN — LISINOPRIL 2.5 MG: 2.5 TABLET ORAL at 08:32

## 2025-07-03 RX ADMIN — FEXOFENADINE HCL 180 MG: 180 TABLET ORAL at 08:32

## 2025-07-03 ASSESSMENT — ACTIVITIES OF DAILY LIVING (ADL)
ADLS_ACUITY_SCORE: 44

## 2025-07-03 NOTE — PROGRESS NOTES
[facility] HEART CARE   [address]  [place] [address]   [email or web address]   OFFICE: [phone]     CARDIOLOGY FOLLOW-UP NOTE      Impression and Plan     ASSESSMENT  Left scapular pain with elevated troponin  Stress cardiomyopathy  Troponin [id number].  ECG reassuring.    Echo with reduced LVEF 25 to 30% consistent with stress cardiomyopathy.    PLAN  Discontinue lisinopril.  Risk of hypotension and falls outweighs benefit of cardiomyopathy especially with suspected cause being stress-induced with expected course of resolution   Repeat echo 4 to 6 weeks with consideration of angiogram at that time if EF not recovered  Ok for discharge today     Cardiology will sign off, please call with any further questions. Thank you for allowing us to partake in the care of this patient.       Follow up: JENN following echocardiogram    Primary Cardiologist: Seen by Dr. Diaz in the hospital    Subjective     89-year-old female with history of dyslipidemia, CKD, GERD, hypothyroidism who presented for left scapular pain and vomiting prompting ED visit.  Cardiology was consulted due to troponin elevation.    Patient feeling well this morning without any cardiac implants.  She does have some dizziness at baseline at home.  Endorses maybe some dizziness today but also has not been up to walk around yet.  Denies any shortness of breath, orthopnea.    Cardiac Diagnostics   Telemetry (personally reviewed): Sinus in the 50s to 60s, PVCs    ECG (personally reviewed and interpreted):   ECG, 7/1/2025: Sinus tachycardia ventricular of 101, first-degree AV block, PVCs, left axis deviation    Echocardiogram (results reviewed):   TTE, 7/2/2025  1. Left ventricular function is decreased. The ejection fraction is 25-30%  (severely reduced).  - There is severe hypokinesis of mid to apical wall segments with relatively  preserved basal segments.  2. Normal right ventricle size and systolic function.  3.  "Normal left atrial size.  4. There is a trivial pericardial effusion located near the basal posterior  wall.  5. No hemodynamically significant valvular abnormalities on 2D or color flow  imaging.  6. There is no comparison study available.      Physical Examination       BP 94/53 (BP Location: Right arm)   Pulse 79   Temp 97.6  F (36.4  C) (Oral)   Resp 20   Ht 1.549 m (5' 1\")   Wt 49.2 kg (108 lb 7.5 oz)   SpO2 97%   BMI 20.49 kg/m          Intake/Output Summary (Last 24 hours) at 7/3/2025 0923  Last data filed at 7/3/2025 0800  Gross per 24 hour   Intake 2216.25 ml   Output 900 ml   Net 1316.25 ml       General: Well appearing, in no acute distress. Resting comfortably in hospital bed  Skin: No clubbing, no cyanosis.  Eyes: Extra ocular movements intact  Neck: No JVD  Lungs: Clear to auscultation bilaterally  Heart: Regular rhythm, PMI not displaced, S1, S2 normal, no S3, no S4, no heaves, no rub and no murmur.  Abdomen: Soft, nontender, bowel sounds normal.  Extremities: No peripheral edema . Grade 2/4 distal pulses bilaterally.  Neuro: Oriented to person, place and time, alert, cooperative, gait coordinated.             Imaging      CTA chest, abdomen pelvis 7/1/2025  IMPRESSION:  1.  Ill-defined curvilinear hypodensity in the descending thoracic aorta and abdominal aorta, which appears discontinuous, most likely artifact from turbulent flow. No convincing evidence of dissection or acute aortic pathology.     2.  Mild interstitial pulmonary edema.     3.  Moderate hiatal hernia.       Lab Results   Lab Results   Component Value Date    BUN 17.6 07/02/2025     07/02/2025    CO2 24 07/02/2025       Lab Results   Component Value Date    WBC 10.5 07/01/2025    HGB 13.5 07/01/2025    HCT 42.3 07/01/2025    MCV 97 07/01/2025     07/01/2025       Lab Results   Component Value Date    TROPONINI 0.02 09/04/2022               Current Inpatient Scheduled Medications   Scheduled Meds:  Current " Facility-Administered Medications   Medication Dose Route Frequency Provider Last Rate Last Admin    fexofenadine (ALLEGRA) tablet 180 mg  180 mg Oral Katherine Bianchi MD   180 mg at 07/03/25 0832    levothyroxine (SYNTHROID/LEVOTHROID) tablet 75 mcg  75 mcg Oral QAM Katherine Bhat MD   75 mcg at 07/03/25 0634    lisinopril (ZESTRIL) tablet 2.5 mg  2.5 mg Oral Daily Marizol Diaz MD   2.5 mg at 07/03/25 0832    pantoprazole (PROTONIX) EC tablet 40 mg  40 mg Oral QAM Katherine Bhat MD   40 mg at 07/03/25 0634    sodium chloride (PF) 0.9% PF flush 3 mL  3 mL Intracatheter Q8H [facility] Katherine Johnston MD   3 mL at 07/03/25 0635     Continuous Infusions:  Current Facility-Administered Medications   Medication Dose Route Frequency Provider Last Rate Last Admin    Patient is already receiving anticoagulation with heparin, enoxaparin (LOVENOX), warfarin (COUMADIN)  or other anticoagulant medication   Does not apply Continuous PRN Katherine Johnston MD                Medications Prior to Admission   Prior to Admission medications    Medication Sig Start Date End Date Taking? Authorizing Provider   acetaminophen (TYLENOL) 500 MG tablet Take 1,000 mg by mouth 3 times daily.   Yes Reported, Patient   fexofenadine (ALLEGRA) 180 MG tablet Take 180 mg by mouth every morning.   Yes Reported, Patient   hydrOXYzine (VISTARIL) 25 MG capsule Take 25 mg by mouth nightly as needed   Yes Unknown, Entered By History   levothyroxine (SYNTHROID/LEVOTHROID) 75 MCG tablet Take 75 mcg by mouth daily before breakfast   Yes Unknown, Entered By History   Multiple Vitamins-Minerals (PRESERVISION AREDS PO) Take 2 Capfuls by mouth every morning.   Yes Reported, Patient   pantoprazole (PROTONIX) 40 MG EC tablet Take 40 mg by mouth daily before breakfast   Yes Unknown, Entered By History   vitamin C (ASCORBIC ACID) 500 MG tablet Take 500 mg by mouth daily   Yes Unknown, Entered By History          Kevin Pendleton PA-C

## 2025-07-03 NOTE — PLAN OF CARE
Problem: Adult Inpatient Plan of Care  Goal: Plan of Care Review  Description: The Plan of Care Review/Shift note should be completed every shift.  The Outcome Evaluation is a brief statement about your assessment that the patient is improving, declining, or no change.  This information will be displayed automatically on your shift  note.  Outcome: Progressing   Goal Outcome Evaluation: shant tredning downward.   Dtr in room.   No intervention at this  time.   Still has right flank pain.  States she takes Extra strength  tylenol at home and paged  For increase.

## 2025-07-03 NOTE — DISCHARGE SUMMARY
[facility]  Discharge Summary - Medicine & Pediatrics       Date of Admission:  7/1/2025  Date of Discharge:  7/3/2025  Discharging Provider: Dr. Julian  Discharge Service: Hospitalist Service    Discharge Diagnoses   Stress cardiomyopathy   Left scapular pain with elevated troponin   Pyruria     Clinically Significant Risk Factors          Follow-ups Needed After Discharge   Follow-up Appointments       Follow Up      Follow up echocardiogram in 4-6 weeks (8/2025). Consider angiogram if not recovered.        Hospital to Primary Care - Establish PCP Referral      Please be aware that coverage of these services is subject to the terms and limitations of your health insurance plan.  Call member services at your health plan with any benefit or coverage questions.    Schedule Primary Care visit within: 7 Days             Unresulted Labs Ordered in the Past 30 Days of this Admission       No orders found from 6/1/2025 to 7/2/2025.            Discharge Disposition   Discharged to home  Condition at discharge: Stable    Hospital Course   Smita Pastor is a 89 year old female admitted on 7/1/2025 - 7/3/2025. She has a past medical history of dyslipidemia GERD, SBO, hernia repair in 2022, and CKD. Patient was admitted for stress cardiomyopathy.      Stress cardiomyopathy   Emesis - resolved   Patient presented to ED on 7/1/25 with 3 episodes of emesis at home, has since resolved. EKG unremarkable for ST elevation. Troponin elevated at 773 with repeat at 707. Patient denies chest pain and shortness of breath. Patient does endorse one week of intermittent achy left scapular region pain, concerning for aortic dissection in the setting of elevated troponin. However, CTA 7/1/25 showed no evidence of dissection or acute aortic pathology. Patient was started on a heparin drip for concerns of an NSTEMI. Patient seen by cardiology. Echo 7/2 demonstrates return with very reduced EF 25-30% in a pattern  consistent with stress cardiomyopathy, not ACS/ischemia. Discussed echo results with patient and daughter. Patient was started on lisinopril 2.5mg, however lisinopril was stopped due to risk of hypotension and falls outweighing benefit for cardiomyopathy.   - Plan recheck echocardiogram in 4-6 weeks and if EF not recovered, can revisit possibility of angiogram      Left Scapular pain   Left scapular pain initially concerning for aortic dissection in the setting of elevated troponin, however CTA showed no evidence of dissection or acute aortic pathology. Pain intermittent for the past week. No swelling, redness, or rash of the left scapular region. Left scapular pain likely due to immobility and laying in bed. During hospitalization, patient reports L scapular pain resolved with tylenol.      Pyuria   UA with WBC, RBCs, and leukocyte esterase. Patient asymptomatic. Denies dysuria and urinary frequency. Patient received one dose of levofloxacin in the ED 7/1. No additional antibiotics given during hospitalization, but can consider additional antibiotics if patient develops urinary symptoms.      GERD   Hiatal hernia   - continue home protonix      Hypothyroidism  - continue home levothyroxine     Consultations This Hospital Stay   PHARMACY IP CONSULT  CARDIOLOGY IP CONSULT    Code Status   No CPR- Do NOT Intubate       The patient was discussed with Dr. Julian.    Jennifer Julian MD  [place] [facility]  [address] [place], [place] [id number]  Phone: [phone]  Fax: [phone]  ______________________________________________________________________    Physical Exam   Vital Signs: Temp: 97.9  F (36.6  C) Temp src: Oral BP: 95/62 Pulse: 87   Resp: 18 SpO2: 92 % O2 Device: Nasal cannula Oxygen Delivery: 1 LPM  Weight: 108 lbs 7.46 oz    Constitutional: awake, alert, cooperative, no apparent distress, and appears stated age  Respiratory: No increased work of  breathing, good air exchange, clear to auscultation bilaterally, no crackles or wheezing.  Cardiovascular: Normal apical impulse, regular rate and rhythm, normal S1 and S2, no S3 or S4, and no murmur noted  GI: No scars, normal bowel sounds, soft, non-distended, non-tender, no masses palpated, no hepatosplenomegally  Skin: normal skin color, texture, turgor, no redness, warmth, or swelling, and no rashes of left scapular region.   Musculoskeletal: There is no redness, warmth, or swelling of the joints.   Neurologic: Awake, alert, oriented to name, place and time.  Neuropsychiatric: General: normal, calm, and normal eye contact      Primary Care Physician   Physician No Ref-Primary    Discharge Orders      Follow-Up with Cardiology      Hospital to Primary Care - Establish PCP Referral      Reason for your hospital stay    You were seen for emesis and left scapular pain. You were found to have elevated troponin concern for stress of the heart. You had an echocardiogram of your heart which showed severely reduced function.     Activity    Your activity upon discharge: activity as tolerated     Follow Up    Follow up echocardiogram in 4-6 weeks (8/2025). Consider angiogram if not recovered.     Diet    Follow this diet upon discharge: Current Diet:Orders Placed This Encounter      Low Saturated Fat Na <2400 mg       Significant Results and Procedures   Results for orders placed or performed during the hospital encounter of 07/01/25   CTA Chest Abdomen Pelvis w Contrast    Narrative    EXAM: CTA CHEST ABDOMEN PELVIS W CONTRAST  LOCATION: [facility]  DATE: 7/1/2025    INDICATION: pain to left shoulder blade for one week, vomited x3 today, h o hernia repair in [place] 2022  COMPARISON: CT abdomen/pelvis 9/5/2022  TECHNIQUE: CT angiogram chest abdomen pelvis during arterial phase of injection of IV contrast. 2D and 3D MIP reconstructions were performed by the CT technologist. Dose reduction  techniques were used.   CONTRAST: 75mL ISOVUE 370    FINDINGS:   CT ANGIOGRAM CHEST, ABDOMEN, AND PELVIS:   Pulmonary Arteries: The pulmonary arteries are well-opacified with contrast. No filling defects are seen to suggest thromboembolism.    Thoracic Aorta: Patent and normal in caliber. Mild aortic calcifications. Ill-defined curvilinear hypodensity in the descending thoracic aorta and abdominal aorta, which appears discontinuous and does not demonstrate clear termination, favored to   represent artifact from turbulent flow. No convincing evidence of dissection, intramural hematoma, or penetrating ulcer.    Abdominal Aorta: Patent and normal in caliber. Severe aortic calcifications. Ill-defined curvilinear hypodensity in the descending thoracic aorta and abdominal aorta, which appears discontinuous and does not demonstrate clear termination, favored to   represent artifact from turbulent flow. No convincing evidence of dissection or penetrating ulcer.    Celiac Artery: Patent.  Superior Mesenteric Artery: Patent.  Right Renal Artery: Patent.  Left Renal Artery: Patent.  Inferior Mesenteric Artery: Patent.    Right Common Iliac Artery: Patent.  Right External Iliac Artery: Patent.  Right Internal Iliac Artery: Patent.  Right Common Femoral Artery: Patent.  Proximal Right Superficial Femoral Artery: Patent.  Proximal Right Deep Femoral Artery: Patent.    Left Common Iliac Artery: Patent.  Left External Iliac Artery: Patent.  Left Internal Iliac Artery: Patent.  Left Common Femoral Artery: Patent.  Proximal Left Superficial Femoral Artery: Patent.  Proximal Left Deep Femoral Artery: Patent.    LUNGS AND PLEURA: Mild interlobular septal thickening, most pronounced at the lung bases and apices. No focal airspace disease. No pleural effusion or pneumothorax. Peripheral linear opacities, most pronounced at the lung bases, favored to represent   scarring.    MEDIASTINUM/AXILLAE: No lymphadenopathy. Mild cardiomegaly.  Trace pericardial effusion.    CORONARY ARTERY CALCIFICATION: Severe.    HEPATOBILIARY: Liver appears unremarkable. Status post cholecystectomy. Mild common duct dilatation and central intrahepatic biliary dilatation, likely postcholecystectomy reservoir effect.    PANCREAS: Normal.    SPLEEN: Normal.    ADRENAL GLANDS: Normal.    KIDNEYS/BLADDER: Normal.    BOWEL: Moderate hiatal hernia. Extensive sigmoid diverticulosis. No evidence of acute diverticulitis. Small bowel anastomosis in the lower abdomen. Small bowel otherwise appears normal. No bowel obstruction. Appendix not visualized, although no secondary   signs of acute appendicitis.    LYMPH NODES: No lymphadenopathy.    PELVIC ORGANS: Status post hysterectomy.    MUSCULOSKELETAL: Chronic bilateral rib fractures. Multilevel degenerative changes of the spine. Chronic T12 compression fracture.      Impression    IMPRESSION:  1.  Ill-defined curvilinear hypodensity in the descending thoracic aorta and abdominal aorta, which appears discontinuous, most likely artifact from turbulent flow. No convincing evidence of dissection or acute aortic pathology.    2.  Mild interstitial pulmonary edema.    3.  Moderate hiatal hernia.     Echocardiogram Complete     Value    LVEF  25-30% (severely reduced)    [facility]    [id number]  [id number]  [id number]  [id number]^[address]I^[place] [address]     Name: FAVIAN MURRAY  MRN: [id number]  : 1924  Study Date: 2025 12:13 PM  Age: 89 yrs  Gender: Female  Patient Location: [facility]  Reason For Study: Acute Myocardial Infarction  Ordering Physician: Marizol Diaz MD  Performed By: MARKO^^^^     BSA: 1.5 m2  Height: 61 in  Weight: 115 lb  HR: 81  BP: 110/68 mmHg  ______________________________________________________________________________  Procedure  Echocardiogram with two-dimensional, color and spectral Doppler. Definity (NDC  #[id number]) given intravenously. Adequate quality  two-dimensional was  performed and interpreted. No hemodynamically significant valvular  abnormalities on 2D or color flow imaging. There is no comparison study  available.  ______________________________________________________________________________  Interpretation Summary     1. Left ventricular function is decreased. The ejection fraction is 25-30%  (severely reduced).  - There is severe hypokinesis of mid to apical wall segments with relatively  preserved basal segments.  2. Normal right ventricle size and systolic function.  3. Normal left atrial size.  4. There is a trivial pericardial effusion located near the basal posterior  wall.  5. No hemodynamically significant valvular abnormalities on 2D or color flow  imaging.  6. There is no comparison study available.  ______________________________________________________________________________  Left Ventricle  The left ventricle is normal in size. Left ventricular function is decreased.  The ejection fraction is 25-30% (severely reduced). There is mild concentric  left ventricular hypertrophy. Left ventricular diastolic function is abnormal.  There is severe hypokinesis of mid to apical wall segments with relatively  preserved basal segments. There is no thrombus seen in the left ventricle.     Right Ventricle  Normal right ventricle size and systolic function.     Atria  Normal left atrial size. Right atrial size is normal.     Mitral Valve  Mitral valve leaflets appear normal. There is no evidence of mitral stenosis  or clinically significant mitral regurgitation.     Tricuspid Valve  Tricuspid valve leaflets appear normal. Right ventricle systolic pressure  estimate normal. The right ventricular systolic pressure is approximated at  31.9 mmHg plus the right atrial pressure. There is mild (1+) tricuspid  regurgitation.     Aortic Valve  The aortic valve is trileaflet with aortic valve sclerosis. There is trace  aortic regurgitation. No aortic stenosis is  present.     Pulmonic Valve  The pulmonic valve is not well seen, but is grossly normal. This degree of  valvular regurgitation is within normal limits. There is trace pulmonic  valvular regurgitation.     Vessels  The aorta root is normal. Normal size ascending aorta. IVC diameter <2.1 cm  collapsing >50% with sniff suggests a normal RA pressure of 3 mmHg.     Pericardium  There is a trivial pericardial effusion located near the basal posterior wall.     ______________________________________________________________________________  MMode/2D Measurements & Calculations  IVSd: 1.3 cm  LVIDd: 3.0 cm  LVIDs: 2.1 cm  LVPWd: 0.96 cm  FS: 28.0 %     LV mass(C)d: 100.8 grams  LV mass(C)dI: 67.5 grams/m2  Ao root diam: 3.3 cm  asc Aorta Diam: 3.6 cm  LVOT diam: 1.7 cm  LVOT area: 2.3 cm2  Ao root diam index Ht(cm/m): 2.1  Ao root diam index BSA (cm/m2): 2.2  Asc Ao diam index BSA (cm/m2): 2.4  Asc Ao diam index Ht(cm/m): 2.4  EF Biplane: 28.5 %  LA Volume Indexed (AL/bp): 30.9 ml/m2     RV Base: 3.0 cm  RWT: 0.65  TAPSE: 1.6 cm     Time Measurements  MM HR: 80.0 BPM     Doppler Measurements & Calculations  MV E max mikey: 94.7 cm/sec  MV A max mikey: 138.7 cm/sec  MV E/A: 0.68  MV max P.1 mmHg  MV mean PG: 3.8 mmHg  MV V2 VTI: 27.8 cm  MVA(VTI): 1.6 cm2  Ao V2 max: 159.8 cm/sec  Ao max PG: 10.0 mmHg  Ao V2 mean: 112.4 cm/sec  Ao mean P.8 mmHg  Ao V2 VTI: 31.4 cm  EBONIE(I,D): 1.4 cm2  EBONIE(V,D): 1.4 cm2  LV V1 max PG: 3.7 mmHg  LV V1 max: 96.5 cm/sec  LV V1 VTI: 18.9 cm  SV(LVOT): 43.9 ml  SI(LVOT): 29.4 ml/m2  PA V2 max: 79.6 cm/sec  PA max P.5 mmHg  PA acc time: 0.08 sec  TR max mikey: 282.3 cm/sec  TR max P.9 mmHg  AV Mikey Ratio (DI): 0.60     EBONIE Index (cm2/m2): 0.94  E/E': 27.9  E/E' av.5  Lateral E/e': 22.9  Medial E/e': 28.1  Peak E' Mikey: 3.4 cm/sec  RV S Mikey: 8.2 cm/sec     ______________________________________________________________________________  Report approved by: Walt Eaton MD on 2025  01:27 PM             Discharge Medications      Review of your medicines        CONTINUE these medicines which have NOT CHANGED        Dose / Directions   acetaminophen 500 MG tablet  Commonly known as: TYLENOL      Dose: 1,000 mg  Take 1,000 mg by mouth 3 times daily.  Refills: 0     fexofenadine 180 MG tablet  Commonly known as: ALLEGRA      Dose: 180 mg  Take 180 mg by mouth every morning.  Refills: 0     hydrOXYzine eneida 25 MG capsule  Commonly known as: VISTARIL      Dose: 25 mg  Take 25 mg by mouth nightly as needed  Refills: 0     levothyroxine 75 MCG tablet  Commonly known as: SYNTHROID/LEVOTHROID      Dose: 75 mcg  Take 75 mcg by mouth daily before breakfast  Refills: 0     pantoprazole 40 MG EC tablet  Commonly known as: PROTONIX      Dose: 40 mg  Take 40 mg by mouth daily before breakfast  Refills: 0     PRESERVISION AREDS PO      Dose: 2 Capful  Take 2 Capfuls by mouth every morning.  Refills: 0     vitamin C 500 MG tablet  Commonly known as: ASCORBIC ACID      Dose: 500 mg  Take 500 mg by mouth daily  Refills: 0            Allergies   Allergies   Allergen Reactions    Amoxicillin Swelling    Statins-Hmg-Coa Reductase Inhibitors [Statins] Unknown

## 2025-07-03 NOTE — PLAN OF CARE
Patient is stable and discharge home with medications. Patient and her daughter received discharge instructions and all questions answered. Patient's daughter has all of patient's belongings. He is being wheeled out and daughter will provide transport.

## 2025-07-03 NOTE — PLAN OF CARE
Goal Outcome Evaluation:       No acute issues overnight. Vitals stable, slept in between cares. Daughter at bedside. No complaints of pain since evening shift gave Tylenol PO.

## 2025-07-05 LAB
ATRIAL RATE - MUSE: 100 BPM
ATRIAL RATE - MUSE: 101 BPM
DIASTOLIC BLOOD PRESSURE - MUSE: 70 MMHG
DIASTOLIC BLOOD PRESSURE - MUSE: NORMAL MMHG
INTERPRETATION ECG - MUSE: NORMAL
INTERPRETATION ECG - MUSE: NORMAL
P AXIS - MUSE: 57 DEGREES
P AXIS - MUSE: 62 DEGREES
PR INTERVAL - MUSE: 206 MS
PR INTERVAL - MUSE: 234 MS
QRS DURATION - MUSE: 104 MS
QRS DURATION - MUSE: 98 MS
QT - MUSE: 328 MS
QT - MUSE: 374 MS
QTC - MUSE: 425 MS
QTC - MUSE: 482 MS
R AXIS - MUSE: -56 DEGREES
R AXIS - MUSE: -63 DEGREES
SYSTOLIC BLOOD PRESSURE - MUSE: 111 MMHG
SYSTOLIC BLOOD PRESSURE - MUSE: NORMAL MMHG
T AXIS - MUSE: 188 DEGREES
T AXIS - MUSE: 83 DEGREES
VENTRICULAR RATE- MUSE: 100 BPM
VENTRICULAR RATE- MUSE: 101 BPM

## 2025-07-07 ENCOUNTER — PATIENT OUTREACH (OUTPATIENT)
Dept: CARE COORDINATION | Facility: CLINIC | Age: OVER 89
End: 2025-07-07
Payer: COMMERCIAL

## 2025-07-07 NOTE — PROGRESS NOTES
Connected Care Resource Center:   [facility] Resource [facility] Contact  [facility]/Voicemail     Clinical Data: Post-Discharge Outreach     Outreach attempted x 2.  Left message on patient's voicemail, providing [facility]'s central phone number of [phone] ([phone]) for questions/concerns and/or to schedule an appt with an [facility] provider, if they do not have a PCP.      Plan:  [facility] will do no further outreaches at this time.       ANGEL Cross  Connected Care Resource [place], [facility]    *Connected Care Resource Team does NOT follow patient ongoing. Referrals are identified based on internal discharge reports and the outreach is to ensure patient has an understanding of their discharge instructions.

## 2025-09-03 ENCOUNTER — HOSPITAL ENCOUNTER (OUTPATIENT)
Dept: CARDIOLOGY | Facility: HOSPITAL | Age: OVER 89
Discharge: HOME | End: 2025-09-03
Payer: MEDICARE

## 2025-09-03 DIAGNOSIS — I50.21 ACUTE SYSTOLIC HEART FAILURE (H): ICD-10-CM

## 2025-09-03 LAB — LVEF ECHO: NORMAL

## 2025-09-03 PROCEDURE — 93308 TTE F-UP OR LMTD: CPT

## 2025-09-03 PROCEDURE — 93325 DOPPLER ECHO COLOR FLOW MAPG: CPT | Mod: 26 | Performed by: INTERNAL MEDICINE

## 2025-09-03 PROCEDURE — 93321 DOPPLER ECHO F-UP/LMTD STD: CPT | Mod: 26 | Performed by: INTERNAL MEDICINE

## 2025-09-03 PROCEDURE — 93308 TTE F-UP OR LMTD: CPT | Mod: 26 | Performed by: INTERNAL MEDICINE
